# Patient Record
Sex: FEMALE | Race: BLACK OR AFRICAN AMERICAN | NOT HISPANIC OR LATINO
[De-identification: names, ages, dates, MRNs, and addresses within clinical notes are randomized per-mention and may not be internally consistent; named-entity substitution may affect disease eponyms.]

---

## 2018-10-30 ENCOUNTER — APPOINTMENT (OUTPATIENT)
Dept: SURGERY | Facility: CLINIC | Age: 28
End: 2018-10-30
Payer: MEDICAID

## 2018-10-30 VITALS
TEMPERATURE: 98 F | SYSTOLIC BLOOD PRESSURE: 106 MMHG | DIASTOLIC BLOOD PRESSURE: 78 MMHG | OXYGEN SATURATION: 100 % | WEIGHT: 293 LBS | HEART RATE: 85 BPM | BODY MASS INDEX: 55.32 KG/M2 | HEIGHT: 61 IN

## 2018-10-30 DIAGNOSIS — Z78.9 OTHER SPECIFIED HEALTH STATUS: ICD-10-CM

## 2018-10-30 DIAGNOSIS — Z87.891 PERSONAL HISTORY OF NICOTINE DEPENDENCE: ICD-10-CM

## 2018-10-30 PROCEDURE — 99204 OFFICE O/P NEW MOD 45 MIN: CPT

## 2018-11-13 ENCOUNTER — APPOINTMENT (OUTPATIENT)
Dept: SURGERY | Facility: CLINIC | Age: 28
End: 2018-11-13

## 2018-11-29 ENCOUNTER — APPOINTMENT (OUTPATIENT)
Dept: SURGERY | Facility: CLINIC | Age: 28
End: 2018-11-29

## 2019-05-14 ENCOUNTER — LABORATORY RESULT (OUTPATIENT)
Age: 29
End: 2019-05-14

## 2019-05-14 ENCOUNTER — APPOINTMENT (OUTPATIENT)
Dept: SURGERY | Facility: CLINIC | Age: 29
End: 2019-05-14
Payer: MEDICAID

## 2019-05-14 VITALS
DIASTOLIC BLOOD PRESSURE: 69 MMHG | HEIGHT: 61 IN | WEIGHT: 293 LBS | TEMPERATURE: 97.4 F | SYSTOLIC BLOOD PRESSURE: 102 MMHG | OXYGEN SATURATION: 96 % | HEART RATE: 82 BPM | BODY MASS INDEX: 55.32 KG/M2

## 2019-05-14 PROCEDURE — 99213 OFFICE O/P EST LOW 20 MIN: CPT

## 2019-05-14 NOTE — END OF VISIT
[FreeTextEntry3] : All medical record entries made by the Scribe were at my, Dr. Boone's direction and personally dictated by me on 05/14/2019  I have reviewed the chart and agree that the record accurately reflects my personal performance of the history, physical exam, assessment and plan. I have also personally directed, reviewed, and agreed with the chart.\par

## 2019-05-14 NOTE — ADDENDUM
[FreeTextEntry1] : Documented by Roberta Shell acting as a scribe for Dr. Oscar Boone on 05/14/2019\par

## 2019-05-14 NOTE — ASSESSMENT
[FreeTextEntry1] : 28 yo F with asthma and  is interested in bariatric surgery. She will begin her work up again.

## 2019-05-14 NOTE — HISTORY OF PRESENT ILLNESS
[de-identified] : 28 yo F with asthma and who presents to the clinic for follow up on her bariatric work up. She was here last year October but did not follow up with her appointments due to work. She wants to begin work up again. Denies any new medical complains since last visit. She is only concerned about her weight. Currently unemployed.

## 2019-05-15 ENCOUNTER — OTHER (OUTPATIENT)
Age: 29
End: 2019-05-15

## 2019-05-17 ENCOUNTER — OTHER (OUTPATIENT)
Age: 29
End: 2019-05-17

## 2019-05-20 ENCOUNTER — OTHER (OUTPATIENT)
Age: 29
End: 2019-05-20

## 2019-05-21 ENCOUNTER — TRANSCRIPTION ENCOUNTER (OUTPATIENT)
Age: 29
End: 2019-05-21

## 2019-05-30 ENCOUNTER — OUTPATIENT (OUTPATIENT)
Dept: OUTPATIENT SERVICES | Facility: HOSPITAL | Age: 29
LOS: 1 days | End: 2019-05-30
Payer: MEDICAID

## 2019-05-30 ENCOUNTER — APPOINTMENT (OUTPATIENT)
Dept: SLEEP CENTER | Facility: HOME HEALTH | Age: 29
End: 2019-05-30
Payer: MEDICAID

## 2019-05-30 PROCEDURE — 95800 SLP STDY UNATTENDED: CPT | Mod: 26

## 2019-05-30 PROCEDURE — 95800 SLP STDY UNATTENDED: CPT

## 2019-05-31 DIAGNOSIS — G47.33 OBSTRUCTIVE SLEEP APNEA (ADULT) (PEDIATRIC): ICD-10-CM

## 2019-06-11 ENCOUNTER — APPOINTMENT (OUTPATIENT)
Dept: BARIATRICS | Facility: CLINIC | Age: 29
End: 2019-06-11

## 2019-06-11 VITALS — BODY MASS INDEX: 55.32 KG/M2 | HEIGHT: 61 IN | WEIGHT: 293 LBS

## 2019-06-27 ENCOUNTER — APPOINTMENT (OUTPATIENT)
Dept: ULTRASOUND IMAGING | Facility: HOSPITAL | Age: 29
End: 2019-06-27
Payer: MEDICAID

## 2019-06-27 ENCOUNTER — APPOINTMENT (OUTPATIENT)
Dept: RADIOLOGY | Facility: HOSPITAL | Age: 29
End: 2019-06-27
Payer: MEDICAID

## 2019-06-27 ENCOUNTER — OUTPATIENT (OUTPATIENT)
Dept: OUTPATIENT SERVICES | Facility: HOSPITAL | Age: 29
LOS: 1 days | End: 2019-06-27
Payer: MEDICAID

## 2019-06-27 PROCEDURE — 74241: CPT | Mod: 26

## 2019-06-27 PROCEDURE — 76700 US EXAM ABDOM COMPLETE: CPT | Mod: 26

## 2019-06-27 PROCEDURE — 76700 US EXAM ABDOM COMPLETE: CPT

## 2019-06-27 PROCEDURE — 74241: CPT

## 2019-07-04 ENCOUNTER — FORM ENCOUNTER (OUTPATIENT)
Age: 29
End: 2019-07-04

## 2019-07-05 ENCOUNTER — OUTPATIENT (OUTPATIENT)
Dept: OUTPATIENT SERVICES | Facility: HOSPITAL | Age: 29
LOS: 1 days | End: 2019-07-05
Payer: COMMERCIAL

## 2019-07-05 ENCOUNTER — APPOINTMENT (OUTPATIENT)
Dept: PULMONOLOGY | Facility: CLINIC | Age: 29
End: 2019-07-05
Payer: MEDICAID

## 2019-07-05 VITALS
RESPIRATION RATE: 12 BRPM | HEIGHT: 61 IN | OXYGEN SATURATION: 96 % | HEART RATE: 95 BPM | DIASTOLIC BLOOD PRESSURE: 70 MMHG | BODY MASS INDEX: 55.32 KG/M2 | WEIGHT: 293 LBS | TEMPERATURE: 98.9 F | SYSTOLIC BLOOD PRESSURE: 110 MMHG

## 2019-07-05 DIAGNOSIS — Z82.49 FAMILY HISTORY OF ISCHEMIC HEART DISEASE AND OTHER DISEASES OF THE CIRCULATORY SYSTEM: ICD-10-CM

## 2019-07-05 PROCEDURE — 71046 X-RAY EXAM CHEST 2 VIEWS: CPT | Mod: 26

## 2019-07-05 PROCEDURE — 99204 OFFICE O/P NEW MOD 45 MIN: CPT

## 2019-07-05 PROCEDURE — 71046 X-RAY EXAM CHEST 2 VIEWS: CPT

## 2019-07-05 NOTE — PHYSICAL EXAM
[General Appearance - Well Developed] : well developed [Well Groomed] : well groomed [General Appearance - Well Nourished] : well nourished [Normal Appearance] : normal appearance [General Appearance - In No Acute Distress] : no acute distress [No Deformities] : no deformities [Normal Conjunctiva] : the conjunctiva exhibited no abnormalities [Normal Oropharynx] : normal oropharynx [Eyelids - No Xanthelasma] : the eyelids demonstrated no xanthelasmas [Neck Appearance] : the appearance of the neck was normal [Neck Cervical Mass (___cm)] : no neck mass was observed [Thyroid Diffuse Enlargement] : the thyroid was not enlarged [Jugular Venous Distention Increased] : there was no jugular-venous distention [Thyroid Nodule] : there were no palpable thyroid nodules [Heart Rate And Rhythm] : heart rate and rhythm were normal [Heart Sounds] : normal S1 and S2 [Respiration, Rhythm And Depth] : normal respiratory rhythm and effort [Murmurs] : no murmurs present [Exaggerated Use Of Accessory Muscles For Inspiration] : no accessory muscle use [Abdomen Soft] : soft [Auscultation Breath Sounds / Voice Sounds] : lungs were clear to auscultation bilaterally [Abdomen Tenderness] : non-tender [Abnormal Walk] : normal gait [Abdomen Mass (___ Cm)] : no abdominal mass palpated [Gait - Sufficient For Exercise Testing] : the gait was sufficient for exercise testing [Skin Color & Pigmentation] : normal skin color and pigmentation [Skin Turgor] : normal skin turgor [Sensation] : the sensory exam was normal to light touch and pinprick [] : no rash [Deep Tendon Reflexes (DTR)] : deep tendon reflexes were 2+ and symmetric [Impaired Insight] : insight and judgment were intact [Oriented To Time, Place, And Person] : oriented to person, place, and time [No Focal Deficits] : no focal deficits [Affect] : the affect was normal

## 2019-07-06 NOTE — HISTORY OF PRESENT ILLNESS
[Difficulty Breathing During Exertion] : denies dyspnea on exertion [Cough] : denies coughing [Feelings Of Weakness On Exertion] : denies exercise intolerance [Regional Soft Tissue Swelling Both Lower Extremities] : denies lower extremity edema [Wheezing] : denies wheezing [Fever] : denies fever [Chest Pain Or Discomfort] : denies chest pain [0  -  Nothing at all] : 0, nothing at all [Class I - No Symptoms and No Limitations] : I [Never] : was never a smoker [URI] : upper respiratory tract infection [Cold] : cold weather [Exercise] : exercise [Smog] : smog [Adherent] : the patient is adherent with ~his/her~ medication regimen [Goals--Doing Well] : the patient is doing well with ~his/her~ goals [Snoring] : snoring [Awakes Unrefreshed] : awakening unrefreshed [Awakening With Dry Mouth] : awakening with dry mouth [Wt Gain ___ Lbs] : no recent weight gain [Wt Loss ___ Lbs] : no recent weight loss [Oxygen] : the patient uses no supplemental oxygen [Good Control] : peak flow has been poor [More Frequent Use Needed Recently] : Patient reports no recent increase in frequency of [Side Effects] : ~He/She~ denies medication side effects [Witnessed Apneas] : no witnessed sleep apnea [Daytime Somnolence] : no daytime somnolence [Awakes with Headache] : no headache upon awakening [FreeTextEntry1] : she is scheduled for bariatric surgery.  She uses the albuterol as needed basis.  She used the albuterol more than often last week as she was sick.  She is not sob.  She can walk few blocks without problem.  The asthma acts up when she gets a cold.

## 2019-07-06 NOTE — ASSESSMENT
[FreeTextEntry1] : Preop evaluation\par DOUGLAS CANO  is optimized for surgery. she  is to be extubated once fully awake and able to protect airway.  The patient is to be monitored in the recovery room. They might benefit for high flow oxygen or noninvasive ventilation to prevent or reverse atelectasis.  Patient is to be admitted to a monitored bed postoperatively.  Avoid oversedation.  DOUGLAS is high risk for DVT and will require bimodal agents for DVT prophylaxis early mobilization is recommended. she is to use the incentive spirometry postoperative. \par \par Bronchial asthma.\par I instructed the patient to continue on Symbicort 2 puffs twice a day and albuterol as needed. Patient was using albuterol frequently. The patient was admitted twice this year. She is intermittently on prednisone. I discussed with the patient effect of morbid obesity owner asthma and the benefit of weight reduction to control her asthma symptoms. I advised the patient to continue Symbicort on regular basis 2 puffs twice a day. PFT. The patient was instructed to hold the Symbicort the day of the test.\par \par Obstructive sleep apnea\par DOUGLAS CANO is to had a [] sleep study. I discussed sleep apnea in detail with the patient. I explained the benefit of weight reduction surgery for sleep apnea.  Mallampati [3]  and neck circumference is [19]  inch .  I explained sleep apnea might not improve with surgery due to the anatomical features with short thick neck and small mandible.  Pt is to repeat the sleep study 2 months after surg if positive for sleep apnea.  I discussed the sleep study with the patient in details and Rusty severe sleep apnea. I will follow the CPAP mask. I explained the benefit of bariatric surgery on sleep apnea. Anatomically she had a short thick neck and the sleep apnea might not resolve with bariatric surgeon\par \par \par

## 2019-07-10 ENCOUNTER — APPOINTMENT (OUTPATIENT)
Dept: PULMONOLOGY | Facility: CLINIC | Age: 29
End: 2019-07-10
Payer: MEDICAID

## 2019-07-10 PROCEDURE — 94060 EVALUATION OF WHEEZING: CPT

## 2019-07-10 PROCEDURE — 94729 DIFFUSING CAPACITY: CPT

## 2019-07-10 PROCEDURE — 94727 GAS DIL/WSHOT DETER LNG VOL: CPT

## 2019-07-15 ENCOUNTER — APPOINTMENT (OUTPATIENT)
Dept: BARIATRICS | Facility: CLINIC | Age: 29
End: 2019-07-15
Payer: MEDICAID

## 2019-07-15 VITALS — WEIGHT: 293 LBS | HEIGHT: 61 IN | BODY MASS INDEX: 55.32 KG/M2

## 2019-07-15 PROCEDURE — 97802 MEDICAL NUTRITION INDIV IN: CPT

## 2019-08-15 ENCOUNTER — APPOINTMENT (OUTPATIENT)
Dept: BARIATRICS | Facility: CLINIC | Age: 29
End: 2019-08-15
Payer: MEDICAID

## 2019-08-15 VITALS — WEIGHT: 293 LBS | BODY MASS INDEX: 55.32 KG/M2 | HEIGHT: 61 IN

## 2019-08-15 PROCEDURE — 97803 MED NUTRITION INDIV SUBSEQ: CPT

## 2019-09-16 ENCOUNTER — APPOINTMENT (OUTPATIENT)
Dept: BARIATRICS | Facility: CLINIC | Age: 29
End: 2019-09-16
Payer: MEDICAID

## 2019-09-16 VITALS — BODY MASS INDEX: 55.32 KG/M2 | HEIGHT: 61 IN | WEIGHT: 293 LBS

## 2019-09-16 PROCEDURE — 97803 MED NUTRITION INDIV SUBSEQ: CPT

## 2019-10-14 ENCOUNTER — APPOINTMENT (OUTPATIENT)
Dept: BARIATRICS | Facility: CLINIC | Age: 29
End: 2019-10-14

## 2020-02-04 ENCOUNTER — APPOINTMENT (OUTPATIENT)
Dept: SURGERY | Facility: CLINIC | Age: 30
End: 2020-02-04
Payer: COMMERCIAL

## 2020-02-04 VITALS
WEIGHT: 293 LBS | BODY MASS INDEX: 55.32 KG/M2 | HEIGHT: 61 IN | DIASTOLIC BLOOD PRESSURE: 79 MMHG | OXYGEN SATURATION: 96 % | HEART RATE: 93 BPM | SYSTOLIC BLOOD PRESSURE: 126 MMHG | TEMPERATURE: 97.2 F

## 2020-02-04 PROCEDURE — 99214 OFFICE O/P EST MOD 30 MIN: CPT

## 2020-02-04 NOTE — ASSESSMENT
[FreeTextEntry1] : 30 y/o F with hx of asthma, PCOS, PreDM, NIR and morbid obesity (BMI 68), with interest in bariatric surgery. She is strongly reminded on the importance of smoking cessation prior to her surgery. \par \par Discussed with the patient on available options in bariatric surgery including VSG vs gastric bypass. Risks including possible MI, DVT, bleeding, Pulmonary Embolism, as well as benefits and the need for post-operative care and required dietary adjustment to the proposed procedures were discussed with the patient and all questions were answered to their satisfaction. \par \par Work up process and need for monthly weigh ins was again explained to the patient. Patient understands and is interested in VSG. Will determine patient's candidacy for VSG after completion and review of work up. Will begin work up process, including a Sleep study to assess for any sleep disorders associated with morbid obesity. She is also to see the nutritionist to start a weight loss diet to help reduce her BMI prior to her surgery. Will follow up here as needed for help in completing work up as well as to discuss the results and to determine a plan of care.

## 2020-02-04 NOTE — HISTORY OF PRESENT ILLNESS
[de-identified] : 28 y/o F with hx of asthma, PCOS, PreDM, NIR and morbid obesity (BMI 68), presents here today for follow up consultation for bariatric surgery. She was previously seen in 2018 where she began her work up but did not follow through with this. She is here today to restart her work up.\par \par She reports having struggled with her weight her whole life which is refractory to diets and exercise. She states that growing up, her mother did not cook healthy foods for her. Additionally, she admits to poor eating habits that exacerbated her weight gain. She reports that today is the heaviest she weighs (362lbs). She has been exercising twice a week so far. She is on albuterol for her asthma. She will be starting employment in the [employer] department. She admits to drinking alcohol occasionally and smoking occasionally. Denies any acid reflux, heartburn, constipation or diarrhea. She is interested in VSG.

## 2020-02-04 NOTE — ADDENDUM
[FreeTextEntry1] : Documented by Roberta Shlel acting as a scribe for Dr. Oscar Boone on 02/04/2020\par

## 2020-02-04 NOTE — END OF VISIT
[FreeTextEntry3] : All medical record entries made by the Scribe were at my, Dr. Boone's direction and personally dictated by me on 02/04/2020  I have reviewed the chart and agree that the record accurately reflects my personal performance of the history, physical exam, assessment and plan. I have also personally directed, reviewed, and agreed with the chart.\par \par

## 2020-02-04 NOTE — PLAN
[FreeTextEntry1] : Pt to start work up, including sleep study to rule out sleep disorders 2/2 obesity.\par See nutri

## 2020-02-05 RX ORDER — IRON PS COMPLEX/B12/FOLIC ACID 150-25-1
150-25-1 CAPSULE ORAL
Qty: 30 | Refills: 6 | Status: ACTIVE | COMMUNITY
Start: 2020-02-05 | End: 1900-01-01

## 2020-02-05 RX ORDER — ERGOCALCIFEROL 1.25 MG/1
1.25 MG CAPSULE, LIQUID FILLED ORAL
Qty: 12 | Refills: 0 | Status: ACTIVE | COMMUNITY
Start: 2020-02-05 | End: 1900-01-01

## 2020-02-25 ENCOUNTER — OUTPATIENT (OUTPATIENT)
Dept: OUTPATIENT SERVICES | Facility: HOSPITAL | Age: 30
LOS: 1 days | End: 2020-02-25
Payer: COMMERCIAL

## 2020-02-25 ENCOUNTER — APPOINTMENT (OUTPATIENT)
Dept: SLEEP CENTER | Facility: HOME HEALTH | Age: 30
End: 2020-02-25
Payer: MEDICAID

## 2020-02-25 PROCEDURE — 95800 SLP STDY UNATTENDED: CPT | Mod: 26

## 2020-02-25 PROCEDURE — 95800 SLP STDY UNATTENDED: CPT

## 2020-02-26 DIAGNOSIS — G47.33 OBSTRUCTIVE SLEEP APNEA (ADULT) (PEDIATRIC): ICD-10-CM

## 2020-03-06 VITALS — WEIGHT: 293 LBS | HEIGHT: 61 IN | BODY MASS INDEX: 55.32 KG/M2

## 2020-03-17 ENCOUNTER — APPOINTMENT (OUTPATIENT)
Dept: BARIATRICS | Facility: CLINIC | Age: 30
End: 2020-03-17

## 2020-03-23 ENCOUNTER — APPOINTMENT (OUTPATIENT)
Dept: BARIATRICS | Facility: CLINIC | Age: 30
End: 2020-03-23
Payer: MEDICAID

## 2020-03-23 PROCEDURE — 98967 PH1 ASSMT&MGMT NQHP 11-20: CPT

## 2020-04-02 ENCOUNTER — APPOINTMENT (OUTPATIENT)
Dept: PULMONOLOGY | Facility: CLINIC | Age: 30
End: 2020-04-02

## 2020-04-09 VITALS — HEIGHT: 61 IN | BODY MASS INDEX: 55.32 KG/M2 | WEIGHT: 293 LBS

## 2020-05-11 ENCOUNTER — APPOINTMENT (OUTPATIENT)
Dept: BARIATRICS | Facility: CLINIC | Age: 30
End: 2020-05-11
Payer: MEDICAID

## 2020-05-11 VITALS — BODY MASS INDEX: 55.32 KG/M2 | HEIGHT: 61 IN | WEIGHT: 293 LBS

## 2020-05-11 PROCEDURE — 98967 PH1 ASSMT&MGMT NQHP 11-20: CPT

## 2020-06-08 VITALS — HEIGHT: 61 IN | WEIGHT: 293 LBS | BODY MASS INDEX: 55.32 KG/M2

## 2020-06-09 LAB
25(OH)D3 SERPL-MCNC: 17.4 NG/ML
A-TOCOPHEROL VIT E SERPL-MCNC: 7.1 MG/L
ALBUMIN SERPL ELPH-MCNC: 3.7 G/DL
ALP BLD-CCNC: 69 U/L
ALT SERPL-CCNC: 26 U/L
ANION GAP SERPL CALC-SCNC: 12 MMOL/L
APPEARANCE: ABNORMAL
AST SERPL-CCNC: 27 U/L
BACTERIA: NEGATIVE
BASOPHILS # BLD AUTO: 0.04 K/UL
BASOPHILS NFR BLD AUTO: 0.6 %
BETA+GAMMA TOCOPHEROL SERPL-MCNC: 2.8 MG/L
BILIRUB SERPL-MCNC: 0.2 MG/DL
BILIRUBIN URINE: NEGATIVE
BLOOD URINE: ABNORMAL
BUN SERPL-MCNC: 10 MG/DL
CA-I SERPL-SCNC: 1.2 MMOL/L
CALCIUM SERPL-MCNC: 9.1 MG/DL
CALCIUM SERPL-MCNC: 9.1 MG/DL
CHLORIDE SERPL-SCNC: 102 MMOL/L
CHOLEST SERPL-MCNC: 181 MG/DL
CHOLEST/HDLC SERPL: 5 RATIO
CO2 SERPL-SCNC: 25 MMOL/L
COLOR: NORMAL
CREAT SERPL-MCNC: 0.81 MG/DL
EOSINOPHIL # BLD AUTO: 0.28 K/UL
EOSINOPHIL NFR BLD AUTO: 4.2 %
ESTIMATED AVERAGE GLUCOSE: 128 MG/DL
FOLATE SERPL-MCNC: 5.9 NG/ML
GLUCOSE QUALITATIVE U: NEGATIVE
GLUCOSE SERPL-MCNC: 79 MG/DL
HBA1C MFR BLD HPLC: 6.1 %
HCG SERPL QL: NEGATIVE
HCT VFR BLD CALC: 35.8 %
HDLC SERPL-MCNC: 36 MG/DL
HGB BLD-MCNC: 10.8 G/DL
HYALINE CASTS: 2 /LPF
IMM GRANULOCYTES NFR BLD AUTO: 0.8 %
INR PPP: 0.97 RATIO
IRON SATN MFR SERPL: 7 %
IRON SERPL-MCNC: 20 UG/DL
KETONES URINE: NEGATIVE
LDLC SERPL CALC-MCNC: 130 MG/DL
LEUKOCYTE ESTERASE URINE: NEGATIVE
LYMPHOCYTES # BLD AUTO: 1.91 K/UL
LYMPHOCYTES NFR BLD AUTO: 28.9 %
MAN DIFF?: NORMAL
MCHC RBC-ENTMCNC: 26.3 PG
MCHC RBC-ENTMCNC: 30.2 GM/DL
MCV RBC AUTO: 87.1 FL
MICROSCOPIC-UA: NORMAL
MONOCYTES # BLD AUTO: 0.58 K/UL
MONOCYTES NFR BLD AUTO: 8.8 %
NEUTROPHILS # BLD AUTO: 3.75 K/UL
NEUTROPHILS NFR BLD AUTO: 56.7 %
NITRITE URINE: NEGATIVE
PAPP-A SERPL-ACNC: <1 MIU/ML
PARATHYROID HORMONE INTACT: 52 PG/ML
PH URINE: 6
PLATELET # BLD AUTO: 371 K/UL
POTASSIUM SERPL-SCNC: 4.2 MMOL/L
PREALB SERPL NEPH-MCNC: 18 MG/DL
PROT SERPL-MCNC: 6.9 G/DL
PROTEIN URINE: NORMAL
PT BLD: 11.2 SEC
RBC # BLD: 4.11 M/UL
RBC # FLD: 18.6 %
RED BLOOD CELLS URINE: 15 /HPF
SODIUM SERPL-SCNC: 140 MMOL/L
SPECIFIC GRAVITY URINE: 1.02
SQUAMOUS EPITHELIAL CELLS: 13 /HPF
TIBC SERPL-MCNC: 302 UG/DL
TRIGL SERPL-MCNC: 75 MG/DL
TSH SERPL-ACNC: 3.46 UIU/ML
UIBC SERPL-MCNC: 282 UG/DL
UREA BREATH TEST QL: NEGATIVE
UROBILINOGEN URINE: NORMAL
VIT A SERPL-MCNC: 25.2 UG/DL
VIT B1 SERPL-MCNC: 75 NMOL/L
VIT B12 SERPL-MCNC: 808 PG/ML
WBC # FLD AUTO: 6.61 K/UL
WHITE BLOOD CELLS URINE: 7 /HPF
ZINC SERPL-MCNC: 60 UG/DL

## 2020-07-08 ENCOUNTER — APPOINTMENT (OUTPATIENT)
Dept: RADIOLOGY | Facility: HOSPITAL | Age: 30
End: 2020-07-08

## 2020-07-08 ENCOUNTER — APPOINTMENT (OUTPATIENT)
Dept: ULTRASOUND IMAGING | Facility: HOSPITAL | Age: 30
End: 2020-07-08

## 2020-08-14 ENCOUNTER — APPOINTMENT (OUTPATIENT)
Dept: SURGERY | Facility: CLINIC | Age: 30
End: 2020-08-14

## 2021-03-02 ENCOUNTER — APPOINTMENT (OUTPATIENT)
Dept: SURGERY | Facility: CLINIC | Age: 31
End: 2021-03-02

## 2021-05-11 ENCOUNTER — LABORATORY RESULT (OUTPATIENT)
Age: 31
End: 2021-05-11

## 2021-05-11 ENCOUNTER — APPOINTMENT (OUTPATIENT)
Dept: SURGERY | Facility: CLINIC | Age: 31
End: 2021-05-11
Payer: MEDICAID

## 2021-05-11 VITALS
HEART RATE: 92 BPM | HEIGHT: 61 IN | SYSTOLIC BLOOD PRESSURE: 117 MMHG | DIASTOLIC BLOOD PRESSURE: 79 MMHG | BODY MASS INDEX: 55.32 KG/M2 | WEIGHT: 293 LBS | TEMPERATURE: 97.6 F | OXYGEN SATURATION: 97 %

## 2021-05-11 PROCEDURE — 99213 OFFICE O/P EST LOW 20 MIN: CPT

## 2021-05-11 PROCEDURE — 99072 ADDL SUPL MATRL&STAF TM PHE: CPT

## 2021-05-11 NOTE — HISTORY OF PRESENT ILLNESS
[de-identified] : Pt is a 32 y/o F with PMHx of obesity (BMI 68.6), asthma, preDM, PCOS, NIR, irregular periods and no significant PSHx who presents for bariatric follow up. She was last seen in 2019 but wants to restart the bariatric workup process now. Pt is doing well overall today. She reports she was out of work for a year and gained a lot of weight during that time but she is planning to go back to work for the [employer] department. She admits to social alcohol. Pt is single and lives with her mother, who is supportive of her decision to seek bariatric surgery. She does not have any children.\par Patient to go for sleep study to evaluate any sleep disordered breathing associated with morbid obesity.

## 2021-05-11 NOTE — END OF VISIT
[FreeTextEntry3] : All medical record entries made by the Scribe were at my, ADRIANA Lyons, discretion and personally dictated by me on 05/11/2021 . I have reviewed the chart and agree that the record accurately reflects my personal performance of the history, physical exam, assessment and plan. I have also personally directed, reviewed and agreed to the chart.

## 2021-05-11 NOTE — ADDENDUM
[FreeTextEntry1] : This note was written by Em Marshall on 05/11/2021 acting as scribe for ADRIANA Lyons.

## 2021-05-11 NOTE — ASSESSMENT
[FreeTextEntry1] : Pt is a 30 y/o F with PMHx of obesity, asthma, preDM who presents for bariatric follow up. Will continue bariatric work up including sleep study to rule out any sleep disorders associated with morbid obesity. I recommended pt work on weight loss prior to the operation. Patient will meet our dietician to discuss nutritional counseling. She will follow up after bariatric testing.

## 2021-05-11 NOTE — PLAN
[FreeTextEntry1] : Will continue bariatric work up including sleep study to rule out any sleep disorders associated with morbid obesity. \par See dietician for wt loss diet preop

## 2021-05-12 DIAGNOSIS — Z00.00 ENCOUNTER FOR GENERAL ADULT MEDICAL EXAMINATION W/OUT ABNORMAL FINDINGS: ICD-10-CM

## 2021-05-12 LAB
25(OH)D3 SERPL-MCNC: 32.3 NG/ML
ALBUMIN SERPL ELPH-MCNC: 4 G/DL
ALP BLD-CCNC: 77 U/L
ALT SERPL-CCNC: 39 U/L
ANION GAP SERPL CALC-SCNC: 12 MMOL/L
APPEARANCE: ABNORMAL
AST SERPL-CCNC: 34 U/L
BACTERIA: NEGATIVE
BASOPHILS # BLD AUTO: 0.06 K/UL
BASOPHILS NFR BLD AUTO: 0.8 %
BILIRUB SERPL-MCNC: 0.2 MG/DL
BILIRUBIN URINE: ABNORMAL
BLOOD URINE: ABNORMAL
BUN SERPL-MCNC: 12 MG/DL
CA-I SERPL-SCNC: 1.18 MMOL/L
CALCIUM SERPL-MCNC: 8.8 MG/DL
CALCIUM SERPL-MCNC: 8.8 MG/DL
CHLORIDE SERPL-SCNC: 102 MMOL/L
CHOLEST SERPL-MCNC: 164 MG/DL
CO2 SERPL-SCNC: 24 MMOL/L
COLOR: ABNORMAL
CREAT SERPL-MCNC: 0.76 MG/DL
EOSINOPHIL # BLD AUTO: 0.44 K/UL
EOSINOPHIL NFR BLD AUTO: 5.5 %
ESTIMATED AVERAGE GLUCOSE: 128 MG/DL
FOLATE SERPL-MCNC: 10.7 NG/ML
GLUCOSE QUALITATIVE U: NEGATIVE
GLUCOSE SERPL-MCNC: 83 MG/DL
HBA1C MFR BLD HPLC: 6.1 %
HCG SERPL QL: NEGATIVE
HCT VFR BLD CALC: 35.1 %
HDLC SERPL-MCNC: 35 MG/DL
HGB BLD-MCNC: 9.6 G/DL
HYALINE CASTS: 0 /LPF
IMM GRANULOCYTES NFR BLD AUTO: 0.5 %
INR PPP: 1.01 RATIO
IRON SATN MFR SERPL: 5 %
IRON SERPL-MCNC: 19 UG/DL
KETONES URINE: NEGATIVE
LDLC SERPL CALC-MCNC: 116 MG/DL
LEUKOCYTE ESTERASE URINE: NEGATIVE
LYMPHOCYTES # BLD AUTO: 2.33 K/UL
LYMPHOCYTES NFR BLD AUTO: 29.2 %
MAN DIFF?: NORMAL
MCHC RBC-ENTMCNC: 21.9 PG
MCHC RBC-ENTMCNC: 27.4 GM/DL
MCV RBC AUTO: 80.1 FL
MICROSCOPIC-UA: NORMAL
MONOCYTES # BLD AUTO: 0.52 K/UL
MONOCYTES NFR BLD AUTO: 6.5 %
NEUTROPHILS # BLD AUTO: 4.58 K/UL
NEUTROPHILS NFR BLD AUTO: 57.5 %
NITRITE URINE: NEGATIVE
NONHDLC SERPL-MCNC: 129 MG/DL
PAPP-A SERPL-ACNC: <1 MIU/ML
PARATHYROID HORMONE INTACT: 49 PG/ML
PH URINE: 5
PLATELET # BLD AUTO: 398 K/UL
POTASSIUM SERPL-SCNC: 4.6 MMOL/L
PREALB SERPL NEPH-MCNC: 20 MG/DL
PROT SERPL-MCNC: 7.2 G/DL
PROTEIN URINE: ABNORMAL
PT BLD: 11.9 SEC
RBC # BLD: 4.38 M/UL
RBC # FLD: 22.6 %
RED BLOOD CELLS URINE: 10 /HPF
SODIUM SERPL-SCNC: 139 MMOL/L
SPECIFIC GRAVITY URINE: >=1.03
SQUAMOUS EPITHELIAL CELLS: 22 /HPF
TIBC SERPL-MCNC: 363 UG/DL
TRIGL SERPL-MCNC: 65 MG/DL
TSH SERPL-ACNC: 2.66 UIU/ML
UIBC SERPL-MCNC: 344 UG/DL
UROBILINOGEN URINE: NORMAL
VIT B12 SERPL-MCNC: 1036 PG/ML
WBC # FLD AUTO: 7.97 K/UL
WHITE BLOOD CELLS URINE: 83 /HPF

## 2021-05-12 RX ORDER — IRON PS COMPLEX/B12/FOLIC ACID 150-25-1
150-25-1 CAPSULE ORAL
Qty: 30 | Refills: 2 | Status: ACTIVE | COMMUNITY
Start: 2021-05-12 | End: 1900-01-01

## 2021-05-13 LAB — UREA BREATH TEST QL: NEGATIVE

## 2021-05-18 ENCOUNTER — APPOINTMENT (OUTPATIENT)
Dept: HEART AND VASCULAR | Facility: CLINIC | Age: 31
End: 2021-05-18
Payer: MEDICAID

## 2021-05-18 VITALS
DIASTOLIC BLOOD PRESSURE: 70 MMHG | OXYGEN SATURATION: 9 % | HEIGHT: 61 IN | SYSTOLIC BLOOD PRESSURE: 110 MMHG | WEIGHT: 293 LBS | BODY MASS INDEX: 55.32 KG/M2 | TEMPERATURE: 98 F | HEART RATE: 81 BPM

## 2021-05-18 LAB
A-TOCOPHEROL VIT E SERPL-MCNC: 7.7 MG/L
BETA+GAMMA TOCOPHEROL SERPL-MCNC: 2.8 MG/L
VIT A SERPL-MCNC: 38.7 UG/DL
VIT B1 SERPL-MCNC: 146.4 NMOL/L
ZINC SERPL-MCNC: 90 UG/DL

## 2021-05-18 PROCEDURE — 99204 OFFICE O/P NEW MOD 45 MIN: CPT

## 2021-05-18 PROCEDURE — 99072 ADDL SUPL MATRL&STAF TM PHE: CPT

## 2021-05-18 PROCEDURE — 99214 OFFICE O/P EST MOD 30 MIN: CPT

## 2021-05-18 PROCEDURE — 93000 ELECTROCARDIOGRAM COMPLETE: CPT | Mod: NC

## 2021-05-18 NOTE — REVIEW OF SYSTEMS
[SOB] : shortness of breath [Joint Pain] : joint pain [Joint Stiffness] : joint stiffness [Negative] : Heme/Lymph

## 2021-05-18 NOTE — HISTORY OF PRESENT ILLNESS
[FreeTextEntry1] : for cardiac evaluation prior to bariatric surgery\par notes SOB, limited walking due to Covid, now vaccinated

## 2021-05-18 NOTE — PHYSICAL EXAM
[Well Developed] : well developed [Well Nourished] : well nourished [No Acute Distress] : no acute distress [Normal Conjunctiva] : normal conjunctiva [Normal Venous Pressure] : normal venous pressure [No Carotid Bruit] : no carotid bruit [Normal S1, S2] : normal S1, S2 [No Murmur] : no murmur [No Rub] : no rub [No Gallop] : no gallop [Clear Lung Fields] : clear lung fields [Good Air Entry] : good air entry [No Respiratory Distress] : no respiratory distress  [Soft] : abdomen soft [Non Tender] : non-tender [No Masses/organomegaly] : no masses/organomegaly [Normal Bowel Sounds] : normal bowel sounds [Normal Gait] : normal gait [No Edema] : no edema [No Cyanosis] : no cyanosis [No Clubbing] : no clubbing [No Rash] : no rash [Moves all extremities] : moves all extremities [No Focal Deficits] : no focal deficits [Normal Speech] : normal speech [Alert and Oriented] : alert and oriented [Normal memory] : normal memory

## 2021-05-25 ENCOUNTER — APPOINTMENT (OUTPATIENT)
Dept: HEART AND VASCULAR | Facility: CLINIC | Age: 31
End: 2021-05-25

## 2021-06-02 ENCOUNTER — APPOINTMENT (OUTPATIENT)
Dept: PULMONOLOGY | Facility: CLINIC | Age: 31
End: 2021-06-02

## 2021-06-28 ENCOUNTER — APPOINTMENT (OUTPATIENT)
Dept: BARIATRICS | Facility: CLINIC | Age: 31
End: 2021-06-28
Payer: MEDICAID

## 2021-06-28 PROCEDURE — 97803 MED NUTRITION INDIV SUBSEQ: CPT | Mod: 95

## 2021-06-30 ENCOUNTER — APPOINTMENT (OUTPATIENT)
Dept: HEMATOLOGY ONCOLOGY | Facility: CLINIC | Age: 31
End: 2021-06-30
Payer: MEDICAID

## 2021-06-30 VITALS
OXYGEN SATURATION: 98 % | WEIGHT: 293 LBS | TEMPERATURE: 97.6 F | HEART RATE: 93 BPM | SYSTOLIC BLOOD PRESSURE: 120 MMHG | BODY MASS INDEX: 55.32 KG/M2 | DIASTOLIC BLOOD PRESSURE: 78 MMHG | HEIGHT: 61 IN

## 2021-06-30 DIAGNOSIS — Z80.3 FAMILY HISTORY OF MALIGNANT NEOPLASM OF BREAST: ICD-10-CM

## 2021-06-30 DIAGNOSIS — Z80.1 FAMILY HISTORY OF MALIGNANT NEOPLASM OF TRACHEA, BRONCHUS AND LUNG: ICD-10-CM

## 2021-06-30 DIAGNOSIS — Z82.3 FAMILY HISTORY OF STROKE: ICD-10-CM

## 2021-06-30 DIAGNOSIS — R53.83 OTHER FATIGUE: ICD-10-CM

## 2021-06-30 DIAGNOSIS — F50.89 OTHER SPECIFIED EATING DISORDER: ICD-10-CM

## 2021-06-30 DIAGNOSIS — D50.9 IRON DEFICIENCY ANEMIA, UNSPECIFIED: ICD-10-CM

## 2021-06-30 LAB
RBC # BLD: 4.36 M/UL
RETICS # AUTO: 2.3 %
RETICS AGGREG/RBC NFR: 98.5 K/UL

## 2021-06-30 PROCEDURE — 99204 OFFICE O/P NEW MOD 45 MIN: CPT | Mod: 25

## 2021-06-30 NOTE — PHYSICAL EXAM
[Obese] : obese [Normal] : affect appropriate [de-identified] : mild conjunctival pallor [de-identified] : morbidly obese

## 2021-06-30 NOTE — HISTORY OF PRESENT ILLNESS
[de-identified] : Patient is a 31 year old female with a history of polycystic ovary syndrome, very heavy and irregular menses, and asthma, who is referred for evaluation and management of iron deficiency anemia. Patient is planning bariatric surgery. Patient admits to irregular and heavy menses lasting for months at a time. She started hormonal medication  to regulate her periods, but discontinued them because they caused weight gain. In May 2021, patient had a hemoglobin of 9.6 with hematocrit 35.1, and a normal white blood count, red blood count, and platelet count. Serum iron was 19 with a TIBC of 363 and 5% saturation. CMP was completely normal. TSH, B12, and Vitamin D were normal. Patient has been taking prescription oral iron for the past month. She reports no family history of anemia. Patient complains of fatigue, craving ice (pica), occasional constipation, and shortness of breath due to asthma. Patient states she felt dizzy and fatigued yesterday at work, which she partially attributes to the hot weather. Denies palpitations, recent infection, fever, chills, and sweats.

## 2021-06-30 NOTE — REVIEW OF SYSTEMS
[Fatigue] : fatigue [Shortness Of Breath] : shortness of breath [SOB on Exertion] : shortness of breath during exertion [Constipation] : constipation [Dizziness] : dizziness [Negative] : Allergic/Immunologic [Fever] : no fever [Chills] : no chills [Night Sweats] : no night sweats [Recent Change In Weight] : ~T no recent weight change [Chest Pain] : no chest pain [Palpitations] : no palpitations [Abdominal Pain] : no abdominal pain [Vomiting] : no vomiting [Diarrhea] : no diarrhea [FreeTextEntry6] : attributed to asthma [FreeTextEntry7] : occasional constipation [FreeTextEntry8] : Very heavy and irregular menses, has PCOS

## 2021-06-30 NOTE — END OF VISIT
[FreeTextEntry3] : All medical record entries made by the Scribe were at my, Dr. Bernie Jordan, direction and personally dictated by me on 06/30/2021. I have reviewed the chart and agree that the record accurately reflects my personal performance of the history, physical exam, assessment and plan. I have also personally directed, reviewed, and agreed with the chart.

## 2021-06-30 NOTE — REASON FOR VISIT
[Initial Consultation] : an initial consultation for [FreeTextEntry2] : Iron deficiency anemia, Fatigue, PICA

## 2021-06-30 NOTE — ASSESSMENT
[FreeTextEntry1] : Patient is a 31 year old female with a history of polycystic ovary syndrome, very heavy and irregular menses and asthma, who is referred for evaluation and management of iron deficiency anemia. Patient is planning bariatric surgery. Possible etiologies for iron deficiency include heavy and prolonged menstrual blood loss and nutritional deficiency due to lack of absorption. Recent blood results show a red blood count number  within the normal range despite anemia, which may be a sign of an underlying hemoglobinopathy as well. Treatment with iron by infusion was discussed but patient is not amenable to this at the present time.Patient will continue prescription iron and was advised to take it together with vitamin C. Have ordered B12 and folate, CBC, CMP, ferritin, hemoglobin electrophoresis, iron panel, and reticulocyte count. Patient was advised to call office to discuss results and further recommendations.

## 2021-07-01 LAB
ALBUMIN SERPL ELPH-MCNC: 4.1 G/DL
ALP BLD-CCNC: 75 U/L
ALT SERPL-CCNC: 32 U/L
ANION GAP SERPL CALC-SCNC: 11 MMOL/L
ANISOCYTOSIS BLD QL: SLIGHT
AST SERPL-CCNC: 36 U/L
BASOPHILS # BLD AUTO: 0 K/UL
BASOPHILS # BLD AUTO: 0 K/UL
BASOPHILS NFR BLD AUTO: 0 %
BASOPHILS NFR BLD AUTO: 0 %
BILIRUB SERPL-MCNC: 0.2 MG/DL
BUN SERPL-MCNC: 11 MG/DL
CALCIUM SERPL-MCNC: 9.5 MG/DL
CHLORIDE SERPL-SCNC: 104 MMOL/L
CO2 SERPL-SCNC: 26 MMOL/L
CREAT SERPL-MCNC: 0.77 MG/DL
EOSINOPHIL # BLD AUTO: 0.41 K/UL
EOSINOPHIL # BLD AUTO: 0.41 K/UL
EOSINOPHIL NFR BLD AUTO: 5.2 %
EOSINOPHIL NFR BLD AUTO: 5.2 %
FERRITIN SERPL-MCNC: 34 NG/ML
FOLATE SERPL-MCNC: >20 NG/ML
GLUCOSE SERPL-MCNC: 88 MG/DL
HCT VFR BLD CALC: 34.5 %
HGB BLD-MCNC: 10.1 G/DL
HYPOCHROMIA BLD QL SMEAR: NORMAL
IRON SATN MFR SERPL: 4 %
IRON SERPL-MCNC: 16 UG/DL
LYMPHOCYTES # BLD AUTO: 2.33 K/UL
LYMPHOCYTES # BLD AUTO: 2.33 K/UL
LYMPHOCYTES NFR BLD AUTO: 29.6 %
LYMPHOCYTES NFR BLD AUTO: 29.6 %
MAN DIFF?: NORMAL
MCHC RBC-ENTMCNC: 23.2 PG
MCHC RBC-ENTMCNC: 29.3 GM/DL
MCV RBC AUTO: 79.1 FL
MICROCYTES BLD QL SMEAR: SLIGHT
MONOCYTES # BLD AUTO: 0.41 K/UL
MONOCYTES # BLD AUTO: 0.41 K/UL
MONOCYTES NFR BLD AUTO: 5.2 %
MONOCYTES NFR BLD AUTO: 5.2 %
MSMEAR: NORMAL
NEUTROPHILS # BLD AUTO: 4.72 K/UL
NEUTROPHILS # BLD AUTO: 4.72 K/UL
NEUTROPHILS NFR BLD AUTO: 60 %
NEUTROPHILS NFR BLD AUTO: 60 %
OVALOCYTES BLD QL SMEAR: SLIGHT
PLAT MORPH BLD: ABNORMAL
PLATELET # BLD AUTO: 455 K/UL
POIKILOCYTOSIS BLD QL SMEAR: SLIGHT
POLYCHROMASIA BLD QL SMEAR: NORMAL
POTASSIUM SERPL-SCNC: 4.3 MMOL/L
PROT SERPL-MCNC: 7.5 G/DL
RBC # BLD: 4.36 M/UL
RBC # FLD: 20.9 %
RBC BLD AUTO: ABNORMAL
SODIUM SERPL-SCNC: 140 MMOL/L
TARGETS BLD QL SMEAR: SLIGHT
TIBC SERPL-MCNC: 372 UG/DL
UIBC SERPL-MCNC: 356 UG/DL
VIT B12 SERPL-MCNC: 1437 PG/ML
WBC # FLD AUTO: 7.87 K/UL

## 2021-07-06 LAB
HGB A MFR BLD: 43.8 %
HGB A2 MFR BLD: 1.6 %
HGB FRACT BLD-IMP: NORMAL
HGB OTHER MFR BLD ELPH: 54.6 %
HGB S BLD QL SOLY: NEGATIVE

## 2021-07-09 ENCOUNTER — APPOINTMENT (OUTPATIENT)
Dept: PULMONOLOGY | Facility: CLINIC | Age: 31
End: 2021-07-09

## 2021-07-12 ENCOUNTER — APPOINTMENT (OUTPATIENT)
Dept: PULMONOLOGY | Facility: CLINIC | Age: 31
End: 2021-07-12
Payer: MEDICAID

## 2021-07-12 VITALS
TEMPERATURE: 97.2 F | HEIGHT: 61 IN | SYSTOLIC BLOOD PRESSURE: 141 MMHG | WEIGHT: 293 LBS | BODY MASS INDEX: 55.32 KG/M2 | HEART RATE: 96 BPM | DIASTOLIC BLOOD PRESSURE: 87 MMHG | OXYGEN SATURATION: 99 %

## 2021-07-12 DIAGNOSIS — Z01.811 ENCOUNTER FOR PREPROCEDURAL RESPIRATORY EXAMINATION: ICD-10-CM

## 2021-07-12 PROCEDURE — 99213 OFFICE O/P EST LOW 20 MIN: CPT

## 2021-07-12 NOTE — REVIEW OF SYSTEMS
[Negative] : Sleep Disorder [Dyspnea] : dyspnea [Difficulty Maintaining Sleep] : difficulty maintaining sleep [Snoring] : snoring

## 2021-07-12 NOTE — HISTORY OF PRESENT ILLNESS
[Difficulty Maintaining Sleep] : difficulty maintaining sleep [Fatigue] : fatigue [Frequent Nocturnal Awakening] : frequent nocturnal awakening [Nonrestorative Sleep] : nonrestorative sleep [Recent  Weight Gain] : recent weight gain [Difficulty Breathing During Exertion] : denies dyspnea on exertion [Feelings Of Weakness On Exertion] : denies exercise intolerance [Cough] : denies coughing [Wheezing] : denies wheezing [Regional Soft Tissue Swelling Both Lower Extremities] : denies lower extremity edema [Chest Pain Or Discomfort] : denies chest pain [Fever] : denies fever [Class I - No Symptoms and No Limitations] : I [Cold] : cold weather [URI] : upper respiratory tract infection [Exercise] : exercise [Smog] : smog [Adherent] : the patient is adherent with ~his/her~ medication regimen [Goals--Doing Well] : the patient is doing well with ~his/her~ goals [Snoring] : snoring [Awakes Unrefreshed] : awakening unrefreshed [Awakening With Dry Mouth] : awakening with dry mouth [Never] : never [Current] : current [Stable] : are stable [1  - Very slight] : 1, very slight [TextBox_4] : 31 yr old female with PMH of Asthma, obesity, Pre DM and iron def anemia.  Presents today for pulmonary clearance for bariatric surgery. \par \par Her asthma is triggered by viral infections and has not been sick over the past year.  She has not needed prednisone in over a year.  Denies wheezing at night, she is rarely using albuterol and has not used in over a year, able to do daily activity and feels her is controlled.  ACT score is 25/25.  She is compliant with Symbicort BID and instructed to wash her mouth out after use. \par \par She denies SOB on flat surfaces but SOB walking up one flight.  She is snoring a night and waking up at night 2-3 times a night to uses the bathroom.  Denies fever, chest pain, wheezing, coughing or edema. \par \par Pt is currently smoking marijuana and cutting down on smoking for surgery.  [Awakes with Dry Mouth] : does not awaken with dry mouth [Tired while Driving] : not tired while driving [TextBox_19] : Moderate NIR with desaturation 9 mins.  [Wt Gain ___ Lbs] : no recent weight gain [Wt Loss ___ Lbs] : no recent weight loss [Oxygen] : the patient uses no supplemental oxygen [Good Control] : peak flow has been poor [More Frequent Use Needed Recently] : Patient reports no recent increase in frequency of [Side Effects] : ~He/She~ denies medication side effects [Witnessed Apneas] : no witnessed sleep apnea [Daytime Somnolence] : no daytime somnolence [Awakes with Headache] : no headache upon awakening

## 2021-07-12 NOTE — REASON FOR VISIT
[Initial] : an initial visit [Pre-op Risk Stratification] : pre-op risk stratification [Asthma] : asthma [TextBox_44] : Bariatric surgery

## 2021-07-12 NOTE — ASSESSMENT
[FreeTextEntry1] : Preop evaluation\par DOUGLAS CANO  is optimized for surgery. she  is to be extubated once fully awake and able to protect airway.  The patient is to be monitored in the recovery room. They might benefit for high flow oxygen or noninvasive ventilation to prevent or reverse atelectasis.  Patient is to be admitted to a monitored bed postoperatively.  Avoid oversedation.  DOUGLAS is high risk for DVT and will require bimodal agents for DVT prophylaxis early mobilization is recommended. she is to use the incentive spirometry postoperative. \par \par Plan\par \par - repeat sleep study and order CPAP prior to surgery\par - CXR\par - Repeat PFT \par \par smoking cessation\par  \par Currently smoking marijuana and currently weaning herself off. Discussed the risk factors with smoking with anesthesia and wound healing with surgery.  Pt is motivated to quit. \par \par Bronchial asthma\par \par I instructed the patient to continue on Symbicort 2 puffs twice a day and albuterol as needed. Patient rarely using her albuterol and has not needed steroids for her asthma in over a year.  Pt denies any recent admission or ER visit in the past year.  I discussed with the patient effect of morbid obesity on asthma and the benefit of weight reduction to control her asthma symptoms. I advised the patient to continue Symbicort on regular basis 2 puffs twice a day. Follow with repeat PFT. The patient was instructed to hold the Symbicort the day of the test.\par \par Obstructive sleep apnea\par \par Home sleep study from 2/2020 showed moderate NIR and 9.3 minutes below 90% oxygen saturation.  Discussed with pt need for treatment pre surgery.  Due to insurance issues we need to repeat home sleep study to get her a CPAP, she never received the CPAP after the last sleep study.  Follow with sleep study and order CPAP.  She is to use pre and post surgery.  Repeat sleep study post weight loss. Discussed weight loss may correct NIR but might continue post weight loss due to anatomy. \par \par \par

## 2021-07-12 NOTE — PHYSICAL EXAM
[General Appearance - Well Developed] : well developed [Normal Appearance] : normal appearance [Well Groomed] : well groomed [General Appearance - Well Nourished] : well nourished [No Deformities] : no deformities [General Appearance - In No Acute Distress] : no acute distress [Normal Conjunctiva] : the conjunctiva exhibited no abnormalities [Eyelids - No Xanthelasma] : the eyelids demonstrated no xanthelasmas [Normal Oropharynx] : normal oropharynx [Neck Appearance] : the appearance of the neck was normal [Neck Cervical Mass (___cm)] : no neck mass was observed [Jugular Venous Distention Increased] : there was no jugular-venous distention [Thyroid Diffuse Enlargement] : the thyroid was not enlarged [Thyroid Nodule] : there were no palpable thyroid nodules [Heart Rate And Rhythm] : heart rate and rhythm were normal [Heart Sounds] : normal S1 and S2 [Murmurs] : no murmurs present [Respiration, Rhythm And Depth] : normal respiratory rhythm and effort [Exaggerated Use Of Accessory Muscles For Inspiration] : no accessory muscle use [Auscultation Breath Sounds / Voice Sounds] : lungs were clear to auscultation bilaterally [Abdomen Soft] : soft [Abdomen Tenderness] : non-tender [Abdomen Mass (___ Cm)] : no abdominal mass palpated [Abnormal Walk] : normal gait [Gait - Sufficient For Exercise Testing] : the gait was sufficient for exercise testing [Skin Color & Pigmentation] : normal skin color and pigmentation [Skin Turgor] : normal skin turgor [] : no rash [Deep Tendon Reflexes (DTR)] : deep tendon reflexes were 2+ and symmetric [Sensation] : the sensory exam was normal to light touch and pinprick [No Focal Deficits] : no focal deficits [Oriented To Time, Place, And Person] : oriented to person, place, and time [Impaired Insight] : insight and judgment were intact [Affect] : the affect was normal

## 2021-07-15 ENCOUNTER — APPOINTMENT (OUTPATIENT)
Dept: RADIOLOGY | Facility: HOSPITAL | Age: 31
End: 2021-07-15
Payer: MEDICAID

## 2021-07-15 ENCOUNTER — APPOINTMENT (OUTPATIENT)
Dept: ULTRASOUND IMAGING | Facility: HOSPITAL | Age: 31
End: 2021-07-15
Payer: MEDICAID

## 2021-07-15 ENCOUNTER — OUTPATIENT (OUTPATIENT)
Dept: OUTPATIENT SERVICES | Facility: HOSPITAL | Age: 31
LOS: 1 days | End: 2021-07-15
Payer: COMMERCIAL

## 2021-07-15 PROCEDURE — 76700 US EXAM ABDOM COMPLETE: CPT | Mod: 26

## 2021-07-15 PROCEDURE — 71046 X-RAY EXAM CHEST 2 VIEWS: CPT | Mod: 26

## 2021-07-15 PROCEDURE — 76700 US EXAM ABDOM COMPLETE: CPT

## 2021-07-15 PROCEDURE — 74240 X-RAY XM UPR GI TRC 1CNTRST: CPT | Mod: 26

## 2021-07-15 PROCEDURE — 74240 X-RAY XM UPR GI TRC 1CNTRST: CPT

## 2021-07-15 PROCEDURE — 71046 X-RAY EXAM CHEST 2 VIEWS: CPT

## 2021-07-19 ENCOUNTER — TRANSCRIPTION ENCOUNTER (OUTPATIENT)
Age: 31
End: 2021-07-19

## 2021-07-20 ENCOUNTER — APPOINTMENT (OUTPATIENT)
Dept: HEART AND VASCULAR | Facility: CLINIC | Age: 31
End: 2021-07-20
Payer: MEDICAID

## 2021-07-20 VITALS
DIASTOLIC BLOOD PRESSURE: 68 MMHG | HEART RATE: 103 BPM | HEIGHT: 61 IN | BODY MASS INDEX: 55.32 KG/M2 | SYSTOLIC BLOOD PRESSURE: 110 MMHG | TEMPERATURE: 97.4 F | OXYGEN SATURATION: 98 % | WEIGHT: 293 LBS

## 2021-07-20 PROCEDURE — 99214 OFFICE O/P EST MOD 30 MIN: CPT

## 2021-07-20 PROCEDURE — 93000 ELECTROCARDIOGRAM COMPLETE: CPT

## 2021-07-20 NOTE — HISTORY OF PRESENT ILLNESS
[FreeTextEntry1] : for cardiac evaluation prior to bariatric surgery\par notes sob, missed prior scheduled evaluation

## 2021-07-21 ENCOUNTER — APPOINTMENT (OUTPATIENT)
Dept: PULMONOLOGY | Facility: CLINIC | Age: 31
End: 2021-07-21
Payer: MEDICAID

## 2021-07-21 PROCEDURE — 94727 GAS DIL/WSHOT DETER LNG VOL: CPT

## 2021-07-21 PROCEDURE — 94729 DIFFUSING CAPACITY: CPT

## 2021-07-21 PROCEDURE — 94060 EVALUATION OF WHEEZING: CPT

## 2021-07-22 ENCOUNTER — TRANSCRIPTION ENCOUNTER (OUTPATIENT)
Age: 31
End: 2021-07-22

## 2021-07-29 ENCOUNTER — APPOINTMENT (OUTPATIENT)
Dept: SLEEP CENTER | Facility: HOME HEALTH | Age: 31
End: 2021-07-29
Payer: MEDICAID

## 2021-07-29 ENCOUNTER — OUTPATIENT (OUTPATIENT)
Dept: OUTPATIENT SERVICES | Facility: HOSPITAL | Age: 31
LOS: 1 days | End: 2021-07-29
Payer: MEDICAID

## 2021-07-29 PROCEDURE — 95800 SLP STDY UNATTENDED: CPT

## 2021-07-29 PROCEDURE — 95800 SLP STDY UNATTENDED: CPT | Mod: 26

## 2021-07-30 DIAGNOSIS — G47.33 OBSTRUCTIVE SLEEP APNEA (ADULT) (PEDIATRIC): ICD-10-CM

## 2021-08-11 ENCOUNTER — APPOINTMENT (OUTPATIENT)
Dept: HEART AND VASCULAR | Facility: CLINIC | Age: 31
End: 2021-08-11
Payer: MEDICAID

## 2021-08-11 VITALS — BODY MASS INDEX: 69.16 KG/M2 | WEIGHT: 293 LBS

## 2021-08-11 VITALS
SYSTOLIC BLOOD PRESSURE: 120 MMHG | OXYGEN SATURATION: 98 % | HEART RATE: 92 BPM | HEIGHT: 61 IN | DIASTOLIC BLOOD PRESSURE: 80 MMHG | BODY MASS INDEX: 55.32 KG/M2 | WEIGHT: 293 LBS | TEMPERATURE: 98 F | RESPIRATION RATE: 12 BRPM

## 2021-08-11 PROCEDURE — 99214 OFFICE O/P EST MOD 30 MIN: CPT | Mod: 25

## 2021-08-11 PROCEDURE — 93306 TTE W/DOPPLER COMPLETE: CPT

## 2021-08-11 PROCEDURE — 93015 CV STRESS TEST SUPVJ I&R: CPT

## 2021-08-17 VITALS — HEIGHT: 61 IN | BODY MASS INDEX: 55.32 KG/M2 | WEIGHT: 293 LBS

## 2021-08-23 LAB
APPEARANCE: ABNORMAL
BACTERIA: ABNORMAL
BILIRUBIN URINE: NEGATIVE
BLOOD URINE: NEGATIVE
COLOR: NORMAL
GLUCOSE QUALITATIVE U: NEGATIVE
HYALINE CASTS: 1 /LPF
KETONES URINE: NEGATIVE
LEUKOCYTE ESTERASE URINE: NEGATIVE
MICROSCOPIC-UA: NORMAL
NITRITE URINE: NEGATIVE
PH URINE: 7
PROTEIN URINE: NORMAL
RED BLOOD CELLS URINE: 1 /HPF
SPECIFIC GRAVITY URINE: 1.02
SQUAMOUS EPITHELIAL CELLS: >27 /HPF
UROBILINOGEN URINE: NORMAL
WHITE BLOOD CELLS URINE: 4 /HPF

## 2021-09-11 VITALS — WEIGHT: 293 LBS | BODY MASS INDEX: 55.32 KG/M2 | HEIGHT: 61 IN

## 2021-12-06 ENCOUNTER — APPOINTMENT (OUTPATIENT)
Dept: BARIATRICS | Facility: CLINIC | Age: 31
End: 2021-12-06

## 2021-12-07 ENCOUNTER — NON-APPOINTMENT (OUTPATIENT)
Age: 31
End: 2021-12-07

## 2023-08-04 NOTE — REVIEW OF SYSTEMS
[SOB] : shortness of breath [Joint Pain] : joint pain [Joint Stiffness] : joint stiffness [Negative] : Heme/Lymph Epidermal Closure: running

## 2023-08-30 ENCOUNTER — APPOINTMENT (OUTPATIENT)
Dept: BARIATRICS | Facility: CLINIC | Age: 33
End: 2023-08-30
Payer: COMMERCIAL

## 2023-08-30 VITALS
BODY MASS INDEX: 55.32 KG/M2 | DIASTOLIC BLOOD PRESSURE: 83 MMHG | TEMPERATURE: 97.3 F | WEIGHT: 293 LBS | OXYGEN SATURATION: 100 % | HEIGHT: 61 IN | SYSTOLIC BLOOD PRESSURE: 133 MMHG | HEART RATE: 86 BPM

## 2023-08-30 DIAGNOSIS — R73.03 PREDIABETES.: ICD-10-CM

## 2023-08-30 PROCEDURE — 99213 OFFICE O/P EST LOW 20 MIN: CPT

## 2023-08-30 NOTE — PLAN
[FreeTextEntry1] : Have ordered lab work and UGI series. Will send her to obtain CPAP machine and proceed to [facility].

## 2023-08-30 NOTE — HISTORY OF PRESENT ILLNESS
[de-identified] : Ms. Randa Kahn comes in today. She is a 33-year-old F patient with a BMI of 67, early diabetes, NIR, arthritis and asthma. Discussed all the options of bariatric surgery with the patient and all the risks and benefits. Importance of maintaining a healthy lifestyle with a healthy diet and exercise both pre and post bariatric surgery emphasized. At this time, have ordered lab work and UGI series. Will send her for sleep study and CPAP machine and obtain any necessary clearances. With a BMI of 67, believe patient will benefit from a stomach and intestine kind of operation. Will proceed to MDS and will begin the bariatric workup.

## 2023-08-30 NOTE — END OF VISIT
[FreeTextEntry3] : All medical record entries made by the Scribe were at my, MORIS Lock , direction and personally dictated by me on 08/30/2023 . I have reviewed the chart and agree that the record accurately reflects my personal performance of the history, physical exam, assessment and plan. I have also personally directed, reviewed, and agreed with the chart.

## 2023-09-05 DIAGNOSIS — E55.9 VITAMIN D DEFICIENCY, UNSPECIFIED: ICD-10-CM

## 2023-09-05 LAB
25(OH)D3 SERPL-MCNC: 18.2 NG/ML
A-TOCOPHEROL VIT E SERPL-MCNC: 7.1 MG/L
ALBUMIN SERPL ELPH-MCNC: 3.9 G/DL
ALP BLD-CCNC: 76 U/L
ALT SERPL-CCNC: 19 U/L
ANION GAP SERPL CALC-SCNC: 11 MMOL/L
APO B SERPL-MCNC: 102 MG/DL
AST SERPL-CCNC: 20 U/L
BETA+GAMMA TOCOPHEROL SERPL-MCNC: 2.7 MG/L
BILIRUB SERPL-MCNC: <0.2 MG/DL
BUN SERPL-MCNC: 11 MG/DL
CALCIUM SERPL-MCNC: 8.9 MG/DL
CALCIUM SERPL-MCNC: 8.9 MG/DL
CHLORIDE SERPL-SCNC: 103 MMOL/L
CHOLEST SERPL-MCNC: 167 MG/DL
CO2 SERPL-SCNC: 25 MMOL/L
CREAT SERPL-MCNC: 0.67 MG/DL
CRP SERPL-MCNC: 26 MG/L
EGFR: 118 ML/MIN/1.73M2
ESTIMATED AVERAGE GLUCOSE: 137 MG/DL
FOLATE SERPL-MCNC: 8 NG/ML
GLUCOSE SERPL-MCNC: 87 MG/DL
HBA1C MFR BLD HPLC: 6.4 %
HDLC SERPL-MCNC: 39 MG/DL
IGF BP1 SERPL-MCNC: 103 NG/ML
INSULIN SERPL-MCNC: 47 UU/ML
IRON SERPL-MCNC: 17 UG/DL
LACTATE BLDA-MCNC: 0.9 MMOL/L
LDLC SERPL CALC-MCNC: 112 MG/DL
NONHDLC SERPL-MCNC: 127 MG/DL
PARATHYROID HORMONE INTACT: 41 PG/ML
POTASSIUM SERPL-SCNC: 4.3 MMOL/L
PREALB SERPL NEPH-MCNC: 16 MG/DL
PROT SERPL-MCNC: 6.8 G/DL
SODIUM SERPL-SCNC: 139 MMOL/L
TRIGL SERPL-MCNC: 81 MG/DL
TSH SERPL-ACNC: 2.62 UIU/ML
VIT A SERPL-MCNC: 24.1 UG/DL
VIT B1 SERPL-MCNC: 79.5 NMOL/L
VIT B12 SERPL-MCNC: 712 PG/ML

## 2023-09-06 ENCOUNTER — APPOINTMENT (OUTPATIENT)
Dept: PULMONOLOGY | Facility: CLINIC | Age: 33
End: 2023-09-06
Payer: COMMERCIAL

## 2023-09-06 ENCOUNTER — OUTPATIENT (OUTPATIENT)
Dept: OUTPATIENT SERVICES | Facility: HOSPITAL | Age: 33
LOS: 1 days | End: 2023-09-06
Payer: COMMERCIAL

## 2023-09-06 VITALS
HEIGHT: 61 IN | HEART RATE: 100 BPM | WEIGHT: 293 LBS | BODY MASS INDEX: 55.32 KG/M2 | OXYGEN SATURATION: 98 % | DIASTOLIC BLOOD PRESSURE: 87 MMHG | TEMPERATURE: 98.1 F | SYSTOLIC BLOOD PRESSURE: 130 MMHG

## 2023-09-06 PROCEDURE — 99203 OFFICE O/P NEW LOW 30 MIN: CPT

## 2023-09-06 PROCEDURE — 71046 X-RAY EXAM CHEST 2 VIEWS: CPT | Mod: 26

## 2023-09-06 PROCEDURE — 71046 X-RAY EXAM CHEST 2 VIEWS: CPT

## 2023-09-06 RX ORDER — ALBUTEROL SULFATE 90 UG/1
108 (90 BASE) INHALANT RESPIRATORY (INHALATION)
Qty: 1 | Refills: 11 | Status: ACTIVE | COMMUNITY
Start: 2023-09-06 | End: 1900-01-01

## 2023-09-06 NOTE — PHYSICAL EXAM
[No Acute Distress] : no acute distress [Normal Oropharynx] : normal oropharynx [Normal Appearance] : normal appearance [Normal Rate/Rhythm] : normal rate/rhythm [Normal S1, S2] : normal s1, s2 [No Murmurs] : no murmurs [No Resp Distress] : no resp distress [Clear to Auscultation Bilaterally] : clear to auscultation bilaterally [No Abnormalities] : no abnormalities [Benign] : benign [Normal Gait] : normal gait [No Clubbing] : no clubbing [No Cyanosis] : no cyanosis [No Edema] : no edema [FROM] : FROM [Normal Color/ Pigmentation] : normal color/ pigmentation [No Focal Deficits] : no focal deficits [Oriented x3] : oriented x3 [Normal Affect] : normal affect

## 2023-09-07 NOTE — ASSESSMENT
[FreeTextEntry1] : Labs: 2021 WBC 7.87 (), Hgb 10.1, Plts 455 2023 Na 139, K 4.3, Cl 103, HCO3 25, BUN/creat 11/0.67, glucose 87 Tbili 0.2, AST/ALT 20/19, Alk phos 76, TP/Albumin 6.8/3.9  CXR (9/6/23): Impression: No acute abnormality visualized.  PFTs           2021 FEV1/FVC   72% FEV1           1.80, 73% FVC              2.49, 87% TLC              3.70, 83% RV                1.22, 94% DLCO           15.21, 61% -Positive bronchodilator response  ACT: 9/6/23:    24  ESS:  9/6/23:      19  Home sleep study (2021): pAHI 35.6, virgie SpO2 81%; time spent <88%: 2.2%  Canet score: 15 (low (1.6%) risk of perioperative pulmonary complication).  A/P:  34 yo F h/o childhood asthma and obesity c/b NIR is evaluated for perioperative risk assessment.  Ms. Kahn has had 2 moderate/severe asthma exacerbations in the last 2 years, both in the setting of COVID. She has minimal symptoms when she is not ill and is not using rescue nor controller medication with any regularity now. Given her severe exacerbation in 1/2023, I recommended that she utilize her Symbicort on a scheduled basis now to optimize lung function and reduce her risk of exacerbation. After her surgery, she is a good candidate to transition to SMART Symbicort therapy. She would also benefit from total smoking cessation (both tobacco and marijuana).   For her risk assessment, her Canet/Ariscat score is low. She only receives points for her abdominal surgery. Opportunities to optimize her management include re-initiation of LABA/ICS, obtaining PFTs, and initiation of CPAP. I would also like to obtain another CXR.  For her NIR, she has severe sleep apnea as per her 2021 home sleep study. We discussed the pathophysiology and symptoms associated with NIR. I suspect her current fatigue and sleepiness are related to untreated sleep apnea. She is amenable to trying CPAP. I will request Auto-PAP 4-20.  1. Childhood asthma, well-controlled 2. COVID infection in 1/2023 with asthma exacerbatoin 3. Obesity 4. Severe NIR  -re-start Symbicort 80/4.5, 2 puffs BID and PRN Albuterol -discussed benefits of regular physical activity, avoiding asthma triggers, and total smoking cessation -CXR and repeat PFTs -will order auto-PAP 4-20 -follow-up with me in 1-2 months

## 2023-09-07 NOTE — HISTORY OF PRESENT ILLNESS
[Former] : former [TextBox_4] : I saw Ms. Randa Kahn today in perioperative risk assessment. In review, Ms. Kahn is a 32 yo F h/o childhood asthma and obesity c/b NIR, pre-DM, and OA who was previously evaluated in our clinic for perioperative risk assessment (2021). Her evaluation included PFTs (mild obstruction, reduced DLCO, + bronchodilator response) and a home sleep study showing severe NIR. She ultimately did not pursue surgery.  Since our last visit, Ms. Kahn has been re-evaluated by Dr. Campo for weight loss surgery.  Today, Ms. Kahn affirms the above. She overall feels well and describes asthma since childhood. She was hospitalized 2 years ago for an asthma exacerbation in the setting of COVID pneumonia (did not require mechanical ventilation). Her last exacerbation was in 1/2023, again in the setting of COVID; she improved with a Prednisone course. Her asthma treatment includes Albuterol and Symbicort, though she has not used either recently. Her last Albuterol use was 2-3 weeks prior, and Symbicort was 4-5 months prior (after her ER visit). She quit smoking cigarettes 3-4 months ago. Exacerbating factors include URIs, dust, and cold weather. Alleviating factors include inhaler use. In terms of asthma, she denies daytime symptoms, night-time awakenings, and activity limitation. 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 She tries to walk daily for exercise, though she is limited by fatigue (which she associates with her weight). She has had difficulty losing weight via diet alone.  Ms. Kahn never pursued CPAP after her sleep study in 2021. Today, she reports unrefreshing sleep, daytime fatigue, and taking naps several times/week.   PMH: Childhood asthma Anemia NIR, severe Pre-DM OA  FH: Maternal grandparent with breast cancer Mom with HTN  SH: Ms. Kahn lives in [facility]. She works for the [employer].   She smoked 2 cigarettes/day for 8 years (0.8 pack-years); she quit smoking in 2023. She denies vaping. She drinks alcohol 1-2X/month. She smokes marijuana 2-3 times/week.  Ms. Kahn endorses dust exposure at work and at home. She denies exposure to Asbestos, Tuberculosis, mold, smoke, fumes, heavy metals, Beryllium, birds, feathers, or hot tubs.  [YearQuit] : 2023

## 2023-09-07 NOTE — ADDENDUM
[FreeTextEntry1] : Addendum ([facility]; 9/6/23): I called Ms. Kahn about her CXR: Impression: No acute abnormality visualized.  We placed the DME request for CPAP. She is awaiting her prescriptions to be filled as well as scheduling of her PFTs.

## 2023-09-07 NOTE — CONSULT LETTER
[Dear  ___] : Dear  [unfilled], [Consult Letter:] : I had the pleasure of evaluating your patient, [unfilled]. [Please see my note below.] : Please see my note below. [Consult Closing:] : Thank you very much for allowing me to participate in the care of this patient.  If you have any questions, please do not hesitate to contact me. [Sincerely,] : Sincerely, [DrShoaib  ___] : Dr. CERDA [FreeTextEntry2] : Alber [FreeTextEntry1] : Dr. Campo- I saw Ms. Kahn today in perioperative evaluation. I think she would benefit from re-initiation of her controller inhaler and initiation of CPAP. She was evaluated in 2021; I think repeat PFTs and a CXR would be worthwhile. I will see her back in 1-2 months.  Please call me with additional thoughts.  Arnulfo Rivas C:  [FreeTextEntry3] : Arnulfo Rivas

## 2023-09-11 ENCOUNTER — APPOINTMENT (OUTPATIENT)
Dept: BARIATRICS | Facility: CLINIC | Age: 33
End: 2023-09-11

## 2023-09-12 ENCOUNTER — APPOINTMENT (OUTPATIENT)
Dept: BARIATRICS | Facility: CLINIC | Age: 33
End: 2023-09-12
Payer: COMMERCIAL

## 2023-09-12 VITALS — WEIGHT: 293 LBS | BODY MASS INDEX: 67.27 KG/M2

## 2023-09-12 PROCEDURE — 98968 PH1 ASSMT&MGMT NQHP 21-30: CPT

## 2023-09-18 LAB — ZINC SERPL-MCNC: 63 UG/DL

## 2023-09-28 ENCOUNTER — APPOINTMENT (OUTPATIENT)
Dept: RADIOLOGY | Facility: HOSPITAL | Age: 33
End: 2023-09-28
Payer: COMMERCIAL

## 2023-09-28 ENCOUNTER — OUTPATIENT (OUTPATIENT)
Dept: OUTPATIENT SERVICES | Facility: HOSPITAL | Age: 33
LOS: 1 days | End: 2023-09-28
Payer: COMMERCIAL

## 2023-09-28 PROCEDURE — 74240 X-RAY XM UPR GI TRC 1CNTRST: CPT | Mod: 26

## 2023-09-28 PROCEDURE — 74240 X-RAY XM UPR GI TRC 1CNTRST: CPT

## 2023-10-04 ENCOUNTER — OUTPATIENT (OUTPATIENT)
Dept: OUTPATIENT SERVICES | Facility: HOSPITAL | Age: 33
LOS: 1 days | End: 2023-10-04
Payer: COMMERCIAL

## 2023-10-04 ENCOUNTER — APPOINTMENT (OUTPATIENT)
Dept: HEART AND VASCULAR | Facility: CLINIC | Age: 33
End: 2023-10-04
Payer: COMMERCIAL

## 2023-10-04 ENCOUNTER — NON-APPOINTMENT (OUTPATIENT)
Age: 33
End: 2023-10-04

## 2023-10-04 ENCOUNTER — APPOINTMENT (OUTPATIENT)
Dept: PULMONOLOGY | Facility: CLINIC | Age: 33
End: 2023-10-04
Payer: COMMERCIAL

## 2023-10-04 VITALS
HEIGHT: 61 IN | OXYGEN SATURATION: 97 % | DIASTOLIC BLOOD PRESSURE: 83 MMHG | BODY MASS INDEX: 55.32 KG/M2 | HEART RATE: 83 BPM | SYSTOLIC BLOOD PRESSURE: 120 MMHG | WEIGHT: 293 LBS | TEMPERATURE: 98.5 F

## 2023-10-04 VITALS
OXYGEN SATURATION: 97 % | SYSTOLIC BLOOD PRESSURE: 120 MMHG | BODY MASS INDEX: 55.32 KG/M2 | HEIGHT: 61 IN | TEMPERATURE: 98.4 F | DIASTOLIC BLOOD PRESSURE: 80 MMHG | HEART RATE: 92 BPM | WEIGHT: 293 LBS

## 2023-10-04 DIAGNOSIS — J45.909 UNSPECIFIED ASTHMA, UNCOMPLICATED: ICD-10-CM

## 2023-10-04 PROCEDURE — 94729 DIFFUSING CAPACITY: CPT | Mod: 26

## 2023-10-04 PROCEDURE — 94618 PULMONARY STRESS TESTING: CPT | Mod: 26

## 2023-10-04 PROCEDURE — 93000 ELECTROCARDIOGRAM COMPLETE: CPT | Mod: NC

## 2023-10-04 PROCEDURE — 94618 PULMONARY STRESS TESTING: CPT

## 2023-10-04 PROCEDURE — 94760 N-INVAS EAR/PLS OXIMETRY 1: CPT

## 2023-10-04 PROCEDURE — 94729 DIFFUSING CAPACITY: CPT

## 2023-10-04 PROCEDURE — 94726 PLETHYSMOGRAPHY LUNG VOLUMES: CPT | Mod: 26

## 2023-10-04 PROCEDURE — 94726 PLETHYSMOGRAPHY LUNG VOLUMES: CPT

## 2023-10-04 PROCEDURE — 94060 EVALUATION OF WHEEZING: CPT

## 2023-10-04 PROCEDURE — 94010 BREATHING CAPACITY TEST: CPT | Mod: 26

## 2023-10-04 PROCEDURE — 99213 OFFICE O/P EST LOW 20 MIN: CPT

## 2023-10-04 PROCEDURE — 99214 OFFICE O/P EST MOD 30 MIN: CPT | Mod: 25

## 2023-10-04 RX ORDER — BUDESONIDE AND FORMOTEROL FUMARATE DIHYDRATE 80; 4.5 UG/1; UG/1
80-4.5 AEROSOL RESPIRATORY (INHALATION) TWICE DAILY
Qty: 1 | Refills: 6 | Status: DISCONTINUED | COMMUNITY
Start: 2023-09-06 | End: 2023-10-04

## 2023-10-04 RX ORDER — BUDESONIDE AND FORMOTEROL FUMARATE DIHYDRATE 160; 4.5 UG/1; UG/1
160-4.5 AEROSOL RESPIRATORY (INHALATION) TWICE DAILY
Qty: 1 | Refills: 6 | Status: ACTIVE | COMMUNITY
Start: 2023-10-04 | End: 1900-01-01

## 2023-10-20 ENCOUNTER — APPOINTMENT (OUTPATIENT)
Dept: BARIATRICS | Facility: CLINIC | Age: 33
End: 2023-10-20

## 2023-10-20 VITALS — BODY MASS INDEX: 66.32 KG/M2 | WEIGHT: 293 LBS

## 2023-10-23 ENCOUNTER — RX RENEWAL (OUTPATIENT)
Age: 33
End: 2023-10-23

## 2023-10-31 ENCOUNTER — APPOINTMENT (OUTPATIENT)
Dept: SLEEP CENTER | Facility: HOME HEALTH | Age: 33
End: 2023-10-31
Payer: COMMERCIAL

## 2023-10-31 ENCOUNTER — OUTPATIENT (OUTPATIENT)
Dept: OUTPATIENT SERVICES | Facility: HOSPITAL | Age: 33
LOS: 1 days | End: 2023-10-31
Payer: COMMERCIAL

## 2023-10-31 PROCEDURE — 95800 SLP STDY UNATTENDED: CPT

## 2023-10-31 PROCEDURE — 95800 SLP STDY UNATTENDED: CPT | Mod: 26

## 2023-11-08 ENCOUNTER — APPOINTMENT (OUTPATIENT)
Dept: PULMONOLOGY | Facility: CLINIC | Age: 33
End: 2023-11-08
Payer: COMMERCIAL

## 2023-11-08 ENCOUNTER — LABORATORY RESULT (OUTPATIENT)
Age: 33
End: 2023-11-08

## 2023-11-08 VITALS
HEART RATE: 86 BPM | HEIGHT: 62.99 IN | SYSTOLIC BLOOD PRESSURE: 138 MMHG | OXYGEN SATURATION: 94 % | TEMPERATURE: 98 F | BODY MASS INDEX: 51.91 KG/M2 | WEIGHT: 293 LBS | DIASTOLIC BLOOD PRESSURE: 90 MMHG

## 2023-11-08 DIAGNOSIS — G47.33 OBSTRUCTIVE SLEEP APNEA (ADULT) (PEDIATRIC): ICD-10-CM

## 2023-11-08 DIAGNOSIS — Z72.0 TOBACCO USE: ICD-10-CM

## 2023-11-08 PROCEDURE — 99203 OFFICE O/P NEW LOW 30 MIN: CPT

## 2023-11-08 PROCEDURE — 99213 OFFICE O/P EST LOW 20 MIN: CPT

## 2023-11-08 RX ORDER — MONTELUKAST 10 MG/1
10 TABLET, FILM COATED ORAL
Qty: 30 | Refills: 3 | Status: ACTIVE | COMMUNITY
Start: 2023-11-08 | End: 1900-01-01

## 2023-11-08 RX ORDER — MONTELUKAST 10 MG/1
10 TABLET, FILM COATED ORAL
Qty: 30 | Refills: 3 | Status: DISCONTINUED | COMMUNITY
Start: 2023-11-08 | End: 2023-11-08

## 2023-11-08 RX ORDER — PREDNISONE 20 MG/1
20 TABLET ORAL DAILY
Qty: 10 | Refills: 0 | Status: ACTIVE | COMMUNITY
Start: 2023-11-08 | End: 1900-01-01

## 2023-11-08 RX ORDER — NICOTINE 4 MG/1
10 INHALANT RESPIRATORY (INHALATION)
Qty: 168 | Refills: 3 | Status: ACTIVE | COMMUNITY
Start: 2023-11-08 | End: 1900-01-01

## 2023-11-09 LAB
RAPID RVP RESULT: NOT DETECTED
SARS-COV-2 RNA PNL RESP NAA+PROBE: NOT DETECTED

## 2023-11-13 LAB
A ALTERNATA IGE QN: 0.16 KUA/L
A FUMIGATUS IGE QN: 0.4 KUA/L
C ALBICANS IGE QN: 0.75 KUA/L
C HERBARUM IGE QN: 0.25 KUA/L
CAT DANDER IGE QN: 1.69 KUA/L
COMMON RAGWEED IGE QN: <0.1 KUA/L
D FARINAE IGE QN: 0.12 KUA/L
D PTERONYSS IGE QN: 0.13 KUA/L
DEPRECATED A ALTERNATA IGE RAST QL: NORMAL
DEPRECATED A FUMIGATUS IGE RAST QL: 1
DEPRECATED C ALBICANS IGE RAST QL: 2
DEPRECATED C HERBARUM IGE RAST QL: NORMAL
DEPRECATED CAT DANDER IGE RAST QL: 2
DEPRECATED COMMON RAGWEED IGE RAST QL: 0
DEPRECATED D FARINAE IGE RAST QL: NORMAL
DEPRECATED D PTERONYSS IGE RAST QL: NORMAL
DEPRECATED DOG DANDER IGE RAST QL: 2
DEPRECATED M RACEMOSUS IGE RAST QL: 0
DEPRECATED ROACH IGE RAST QL: 2
DEPRECATED TIMOTHY IGE RAST QL: 0
DEPRECATED WHITE OAK IGE RAST QL: 0
DOG DANDER IGE QN: 1.33 KUA/L
M RACEMOSUS IGE QN: <0.1 KUA/L
ROACH IGE QN: 2.08 KUA/L
TIMOTHY IGE QN: <0.1 KUA/L
TOTAL IGE SMQN RAST: 239 KU/L
WHITE OAK IGE QN: <0.1 KUA/L

## 2023-11-16 ENCOUNTER — APPOINTMENT (OUTPATIENT)
Dept: HEART AND VASCULAR | Facility: CLINIC | Age: 33
End: 2023-11-16
Payer: COMMERCIAL

## 2023-11-16 DIAGNOSIS — Z01.810 ENCOUNTER FOR PREPROCEDURAL CARDIOVASCULAR EXAMINATION: ICD-10-CM

## 2023-11-16 DIAGNOSIS — R06.02 SHORTNESS OF BREATH: ICD-10-CM

## 2023-11-16 PROCEDURE — 99214 OFFICE O/P EST MOD 30 MIN: CPT | Mod: 25

## 2023-11-16 PROCEDURE — 93306 TTE W/DOPPLER COMPLETE: CPT

## 2023-11-16 PROCEDURE — 93015 CV STRESS TEST SUPVJ I&R: CPT

## 2023-11-20 ENCOUNTER — RX RENEWAL (OUTPATIENT)
Age: 33
End: 2023-11-20

## 2023-12-13 ENCOUNTER — RX RENEWAL (OUTPATIENT)
Age: 33
End: 2023-12-13

## 2023-12-13 RX ORDER — ERGOCALCIFEROL 1.25 MG/1
1.25 MG CAPSULE, LIQUID FILLED ORAL
Qty: 12 | Refills: 0 | Status: ACTIVE | COMMUNITY
Start: 2023-09-05 | End: 1900-01-01

## 2024-01-10 ENCOUNTER — APPOINTMENT (OUTPATIENT)
Dept: PULMONOLOGY | Facility: CLINIC | Age: 34
End: 2024-01-10
Payer: COMMERCIAL

## 2024-01-10 VITALS
DIASTOLIC BLOOD PRESSURE: 84 MMHG | OXYGEN SATURATION: 97 % | WEIGHT: 293 LBS | BODY MASS INDEX: 51.91 KG/M2 | HEIGHT: 62.99 IN | HEART RATE: 83 BPM | TEMPERATURE: 97.4 F | SYSTOLIC BLOOD PRESSURE: 122 MMHG

## 2024-01-10 DIAGNOSIS — J45.909 UNSPECIFIED ASTHMA, UNCOMPLICATED: ICD-10-CM

## 2024-01-10 DIAGNOSIS — G47.33 OBSTRUCTIVE SLEEP APNEA (ADULT) (PEDIATRIC): ICD-10-CM

## 2024-01-10 PROCEDURE — 99213 OFFICE O/P EST LOW 20 MIN: CPT | Mod: 25

## 2024-01-10 PROCEDURE — 99406 BEHAV CHNG SMOKING 3-10 MIN: CPT

## 2024-01-10 RX ORDER — NICOTINE POLACRILEX 4 MG/1
4 GUM, CHEWING BUCCAL
Qty: 168 | Refills: 3 | Status: ACTIVE | COMMUNITY
Start: 2024-01-10 | End: 1900-01-01

## 2024-01-10 NOTE — REVIEW OF SYSTEMS
[Fatigue] : fatigue [A.M. Dry Mouth] : a.m. dry mouth [Negative] : Neurologic [Cough] : no cough [Chest Tightness] : no chest tightness [Sputum] : no sputum [Dyspnea] : no dyspnea [SOB on Exertion] : no sob on exertion

## 2024-01-10 NOTE — ASSESSMENT
[FreeTextEntry1] : Labs: 2021 - WBC 7.87 (), Hgb 10.1, Plts 455  2023- Na 139, K 4.3, Cl 103, HCO3 25, BUN/creat 11/0.67, glucose 87 Tbili 0.2, AST/ALT 20/19, Alk phos 76, TP/Albumin 6.8/3.9  IgE - 256 RAST +cats, dogs, aspergillus, candida, cockroach  CXR (9/6/23): Impression: No acute abnormality visualized.  PFTs 2021 2023 FEV1/FVC 72% 71% FEV1 1.80, 73% 1.81, 71% FVC 2.49, 87% 2.57, 86% TLC 3.70, 83% 4.02, 83% RV 1.22, 94% 1.39, 123% DLCO 15.21, 61% 16.29, 78% -Positive bronchodilator response in 10/2023  ACT: 9/6/23: 24  ESS: 9/6/23: 19 1/10/24: 9  Home sleep study 2021: pAHI 35.6, virgie SpO2 81%; time spent <88%: 2.2% 2023: AHI 49, virgie SpO2 70%  Canet score: 15 (low (1.6%) risk of perioperative pulmonary complication).  A/P: 34 yo F h/o childhood asthma and obesity c/b severe NIR returns to clinic for perioperative risk assessment for bariatric surgery and follow up of NIR and asthma.  Ms. Kahn is doing well today. With CPAP, her ESS is significantly improved. If her adherence report is favorable, I would not have objections to returning to surgery.  For her asthma, she is not having exacerbations, and her symptom burden is low. She has cut down on marijuana smoking. She is still smoking tobacco occasionally; she would like to try Nicotine gum.  1. Moderate persistent asthma with recent exacerbations (9/27/23, 11/2023) 2. COVID infection in 1/2023 with asthma exacerbation 3. Obesity 4. Severe NIR 5. Tobacco use 6. Marijuana use  -continue Symbicort to 160 mcg/4.5 mcg/inh, 2 puffs BID; PRN Albuterol -Montelukast 10 mg PO qHS (she denies history of depression, SI, or HI) -will check CBC at next visit to obtain Abs eos count -recommended continuing total smoking cessation; ordered nicotine gum -encouraged marijuana cessation -continue autoPAP (4-20 cmH2O) request adherence report from DME -for concerns of leak and mask fitting, will request sleep clinic mask fitting acclimation visit -weight loss encouraged -Vaccinations: not discuss today -follow-up with me in 3 months with PFTs

## 2024-01-10 NOTE — HISTORY OF PRESENT ILLNESS
[Former] : former [Current] : current [TextBox_4] : Ms. Kahn is a 34 yo F h/o childhood asthma and obesity c/b NIR, pre-DM, and OA who was previously evaluated in our clinic for perioperative risk assessment (2021). Her evaluation included PFTs (mild obstruction, reduced DLCO, + bronchodilator response) and a home sleep study showing severe NIR (AHI of 35.6). At the time, Auto-PAP was prescribed but not utilized. Our evaluation has included CXR, PFTs, and sleep study.  10/2023: we re-ordered a sleep study, increased LABA/ICS dose due to a recent exacerbation and discussed total smoking cessation  11/8/2023: Ms. Kahn is feeling sick (rhinorrhea, wheezing, cough w/ clear sputum). After increasing her Symbicort dosing, she felt "really well" for a while. She smokes marijuana 1-2 times/week. RVP done negative, asthma profile + cat, dog, cockroach, aspergillus, candida. Recommended indoor air filter. IgE 239. Sleep study showed AHI of 49 severe NIR so autopap was prescribed again  1/2024: using cpap x 1 month, consistent use aside form missing it 2x/week. ESS 9 today. overall more energy during daytime with some perceived benefit from CPAP. Only having issues with mask comfort and leaks. Will have our sleep personel to mask study to see if there is a better fit for her. ALso reports dry mouth. She is not humidfying her air so instructed her on that. For her asthma, she is doing well on her current regimen of Symbicort BID and PRN albuterol (2x/week). She still has marijuana use 2x/week. not smoking. no steroid or abx use in the last few months.   PMH: Childhood asthma Anemia NIR, severe Pre-DM OA  FH: Maternal grandparent with breast cancer Mom with HTN  SH: Ms. Kahn lives [place] [facility]. She works for the [employer].  She smoked 2 cigarettes/day for 8 years (0.8 pack-years); she has quit that in 2023. She denies vaping. She drinks alcohol 1-2X/month. She smokes marijuana 2-3 times/week.  [TextBox_27] : 2x/week

## 2024-01-10 NOTE — PHYSICAL EXAM
[No Acute Distress] : no acute distress [Normal Oropharynx] : normal oropharynx [Normal Appearance] : normal appearance [No Neck Mass] : no neck mass [Normal Rate/Rhythm] : normal rate/rhythm [Normal S1, S2] : normal s1, s2 [No Murmurs] : no murmurs [No Resp Distress] : no resp distress [No Abnormalities] : no abnormalities [Benign] : benign [Normal Gait] : normal gait [No Clubbing] : no clubbing [No Edema] : no edema [Normal Color/ Pigmentation] : normal color/ pigmentation [No Focal Deficits] : no focal deficits [Oriented x3] : oriented x3 [Normal Affect] : normal affect [TextBox_11] : crowding [TextBox_68] : faint exp wheezes left upper lobe; improved after coughing.

## 2024-02-17 ENCOUNTER — APPOINTMENT (OUTPATIENT)
Dept: SLEEP CENTER | Facility: HOSPITAL | Age: 34
End: 2024-02-17

## 2024-04-17 ENCOUNTER — LABORATORY RESULT (OUTPATIENT)
Age: 34
End: 2024-04-17

## 2024-04-17 ENCOUNTER — APPOINTMENT (OUTPATIENT)
Dept: PULMONOLOGY | Facility: CLINIC | Age: 34
End: 2024-04-17
Payer: COMMERCIAL

## 2024-04-17 ENCOUNTER — RESULT REVIEW (OUTPATIENT)
Age: 34
End: 2024-04-17

## 2024-04-17 ENCOUNTER — OUTPATIENT (OUTPATIENT)
Dept: OUTPATIENT SERVICES | Facility: HOSPITAL | Age: 34
LOS: 1 days | End: 2024-04-17
Payer: COMMERCIAL

## 2024-04-17 VITALS
TEMPERATURE: 97.3 F | DIASTOLIC BLOOD PRESSURE: 71 MMHG | HEIGHT: 62.99 IN | WEIGHT: 293 LBS | BODY MASS INDEX: 51.91 KG/M2 | RESPIRATION RATE: 12 BRPM | HEART RATE: 83 BPM | SYSTOLIC BLOOD PRESSURE: 110 MMHG | OXYGEN SATURATION: 95 %

## 2024-04-17 PROCEDURE — 94729 DIFFUSING CAPACITY: CPT

## 2024-04-17 PROCEDURE — 94060 EVALUATION OF WHEEZING: CPT

## 2024-04-17 PROCEDURE — 94618 PULMONARY STRESS TESTING: CPT

## 2024-04-17 PROCEDURE — 99213 OFFICE O/P EST LOW 20 MIN: CPT | Mod: 25

## 2024-04-17 PROCEDURE — ZZZZZ: CPT

## 2024-04-17 PROCEDURE — 71046 X-RAY EXAM CHEST 2 VIEWS: CPT | Mod: 26

## 2024-04-17 PROCEDURE — 94727 GAS DIL/WSHOT DETER LNG VOL: CPT

## 2024-04-17 PROCEDURE — 71046 X-RAY EXAM CHEST 2 VIEWS: CPT

## 2024-04-17 RX ORDER — SEMAGLUTIDE 0.5 MG/.5ML
0.5 INJECTION, SOLUTION SUBCUTANEOUS
Qty: 1 | Refills: 0 | Status: DISCONTINUED | COMMUNITY
Start: 2023-08-30 | End: 2024-04-17

## 2024-04-18 LAB
BASOPHILS # BLD AUTO: 0.03 K/UL
BASOPHILS NFR BLD AUTO: 0.4 %
EOSINOPHIL # BLD AUTO: 0.44 K/UL
EOSINOPHIL NFR BLD AUTO: 6.1 %
HCT VFR BLD CALC: 36 %
HGB BLD-MCNC: 10.7 G/DL
IMM GRANULOCYTES NFR BLD AUTO: 0.4 %
LYMPHOCYTES # BLD AUTO: 1.9 K/UL
LYMPHOCYTES NFR BLD AUTO: 26.2 %
MAN DIFF?: NORMAL
MCHC RBC-ENTMCNC: 23.7 PG
MCHC RBC-ENTMCNC: 29.7 GM/DL
MCV RBC AUTO: 79.8 FL
MONOCYTES # BLD AUTO: 0.52 K/UL
MONOCYTES NFR BLD AUTO: 7.2 %
NEUTROPHILS # BLD AUTO: 4.32 K/UL
NEUTROPHILS NFR BLD AUTO: 59.7 %
PLATELET # BLD AUTO: 404 K/UL
RBC # BLD: 4.51 M/UL
RBC # FLD: 20.2 %
WBC # FLD AUTO: 7.24 K/UL

## 2024-04-18 NOTE — ADDENDUM
[FreeTextEntry1] : Addendum (Evelyn; 4/18/24): I called Ms. Kahn about her CBC:  WBC 7.24 (), Hgb 10.7, Plts 404 MCV 79.8; reductions in MCH and MCHC  Ms. Kahn' AEC is mildly elevated, consistent with her asthma. She is persistently anemic with suggestion of iron deficiency. She affirms that she has had iron deficiency in the past, and she ran out of iron. She is seeing her primary care physician today, and she will discuss her anemia and iron re-initiation.

## 2024-04-18 NOTE — ASSESSMENT
[FreeTextEntry1] : Labs: 2021 WBC 7.87 (), Hgb 10.1, Plts 455  2023 Na 139, K 4.3, Cl 103, HCO3 25, BUN/creat 11/0.67, glucose 87 Tbili 0.2, AST/ALT 20/19, Alk phos 76, TP/Albumin 6.8/3.9  IgE: 256 RAST: +cats, dogs, aspergillus, candida, cockroach  CXR (9/6/23): Impression: No acute abnormality visualized.  PFTs            2021 2023 2024 FEV1/FVC   72%            71%            71% FEV1           1.80, 73%   1.81, 71%   1.89, 79% FVC             2.49, 87%   2.57, 86%   2.65, 94% TLC             3.70, 83%    4.02, 83%   3.56, 80% RV               1.22, 94%    1.39, 123% 1.11, 82% ERV                                                     0.22, 19% DLCO          15.21, 61%  16.29, 78%   16.72, 68% -10/2023: Positive bronchodilator response  -4/2024: Positive bronchodilator response  ACT: 9/6/23: 24  ESS: 9/6/23: 19 1/10/24: 9  Home sleep study 2021: pAHI 35.6, virgie SpO2 81%; time spent <88%: 2.2% 2023: AHI 49, virgie SpO2 70%  CPAP Adherence report 4/2024: Usage >4 hours/night 1%. Leak 12.5 L/min. AHI 5.1. RITU 0.0  Canet score: 15 (low (1.6%) risk of perioperative pulmonary complication).  A/P: 35 yo F h/o childhood asthma and obesity c/b severe NIR returns to clinic.  Ms. Kahn asthma is well-controlled; she is not having exacerbations. With high dose LABA/ICS and Montelukast, she is not needing Albuterol. She has quit smoking tobacco and marijuana. She continues to have a bronchodilator response. I would like to check a FENO at her next visit.   For her NIR, she has the CPAP machine. Her documented adherence is quite low; she suspects that machine is not working properly. I will request the DME company to review. We previously discussed a sleep acclamation visit, though this was declined by her insurance. She was able to speak to our sleep team today to review different mask types.  1. Moderate persistent asthma with exacerbations (1/2023, 9/27/23, 11/2023) 2. Obesity 3. Severe NIR 4. Tobacco use, achieved cessation 5. Marijuana use, no longer smoking  -with CPAP use and improved asthma control, I do not identify any barriers to her weight loss surgery -congratulated on achieving smoking cessation -continue Symbicort to 160 mcg/4.5 mcg/inh, 2 puffs BID; PRN Albuterol -Montelukast 10 mg PO qHS -check CBC today for AEC -continue autoPAP (4-20 cmH2O); will review adherence function of CPAP machine with her DME company -reviewed mask fitting with our sleep team today -encouraged regular, low impact physical activivity -Vaccinations: not discussed today -follow-up with me in 3-6 months with FENO

## 2024-04-18 NOTE — HISTORY OF PRESENT ILLNESS
[Former] : former [Current] : current [TextBox_4] : Ms. Kahn is a 33 yo F h/o childhood asthma and obesity c/b NIR, pre-DM, and OA who was previously evaluated in our clinic for perioperative risk assessment (2021). Her evaluation included PFTs (mild obstruction, reduced DLCO, + bronchodilator response) and a home sleep study showing severe NIR (AHI of 35.6). At the time, Auto-PAP was prescribed but not utilized. Our evaluation has included CXR, PFTs, and sleep study.  10/2023:  We re-ordered a sleep study, increased LABA/ICS dose due to a recent exacerbation and discussed total smoking cessation.  11/8/2023:  Ms. Kahn is feeling sick (rhinorrhea, wheezing, cough w/ clear sputum). After increasing her Symbicort dosing, she felt "really well" for a while. She smokes marijuana 1-2 times/week. RVP done negative, asthma profile + cat, dog, cockroach, aspergillus, candida. Recommended indoor air filter. IgE 239. Sleep study showed AHI of 49 severe NIR so autopap was prescribed again  1/2024:  using cpap x 1 month, consistent use aside form missing it 2x/week. ESS 9 today. overall more energy during daytime with some perceived benefit from CPAP. Only having issues with mask comfort and leaks. Will have our sleep personel to mask study to see if there is a better fit for her. Also reports dry mouth. She is not humidfying her air so instructed her on that. For her asthma, she is doing well on her current regimen of Symbicort BID and PRN albuterol (2x/week). She still has marijuana use 2x/week. not smoking. no steroid or abx use in the last few months.   4/2024: Ms. Kahn is feeling well and has not had respiratory exacerbations since our last visit. She quit smoking tobacco and transitioned all THC use to enteral. She is using her Symbicort as-prescribed, and she is not needing her Albuterol. Her weight loss medication was transitioned to Tirzepatide, which causes more nausea and vomiting. She is wearing her CPAP at least 5 nights/week, though the adherence is not being detected.  PMH: Childhood asthma Anemia NIR, severe Pre-DM OA  FH: Maternal grandparent with breast cancer Mom with HTN  SH: Ms. Kahn lives in [facility]. She works for the [place] [employer].  She smoked 2 cigarettes/day for 8 years (0.8 pack-years); she quit in 2023. She denies vaping. She drinks alcohol 1-2X/month. She smokes marijuana 2-3 times/week.  [TextBox_27] : 2x/week

## 2024-04-18 NOTE — REVIEW OF SYSTEMS
[Fatigue] : fatigue [Negative] : Endocrine [Cough] : no cough [Chest Tightness] : no chest tightness [Sputum] : no sputum [Dyspnea] : no dyspnea [SOB on Exertion] : no sob on exertion

## 2024-04-18 NOTE — PHYSICAL EXAM
[No Acute Distress] : no acute distress [Normal Appearance] : normal appearance [Normal Rate/Rhythm] : normal rate/rhythm [Normal S1, S2] : normal s1, s2 [No Murmurs] : no murmurs [No Resp Distress] : no resp distress [No Abnormalities] : no abnormalities [Benign] : benign [Normal Gait] : normal gait [No Clubbing] : no clubbing [No Edema] : no edema [Normal Color/ Pigmentation] : normal color/ pigmentation [No Focal Deficits] : no focal deficits [Oriented x3] : oriented x3 [Normal Affect] : normal affect [Clear to Auscultation Bilaterally] : clear to auscultation bilaterally [TextBox_11] : NC/AT

## 2024-05-08 RX ORDER — TIRZEPATIDE 2.5 MG/.5ML
2.5 INJECTION, SOLUTION SUBCUTANEOUS
Qty: 1 | Refills: 0 | Status: ACTIVE | COMMUNITY
Start: 2024-03-06 | End: 1900-01-01

## 2024-05-14 ENCOUNTER — APPOINTMENT (OUTPATIENT)
Dept: BARIATRICS | Facility: CLINIC | Age: 34
End: 2024-05-14

## 2024-05-16 ENCOUNTER — APPOINTMENT (OUTPATIENT)
Dept: BARIATRICS | Facility: CLINIC | Age: 34
End: 2024-05-16
Payer: COMMERCIAL

## 2024-05-16 VITALS — WEIGHT: 293 LBS | HEIGHT: 62.99 IN | BODY MASS INDEX: 51.91 KG/M2

## 2024-05-16 DIAGNOSIS — E66.01 MORBID (SEVERE) OBESITY DUE TO EXCESS CALORIES: ICD-10-CM

## 2024-05-16 PROCEDURE — 99203 OFFICE O/P NEW LOW 30 MIN: CPT

## 2024-05-24 VITALS
TEMPERATURE: 97 F | WEIGHT: 293 LBS | DIASTOLIC BLOOD PRESSURE: 87 MMHG | HEIGHT: 62 IN | SYSTOLIC BLOOD PRESSURE: 141 MMHG | HEART RATE: 92 BPM | RESPIRATION RATE: 16 BRPM | OXYGEN SATURATION: 100 %

## 2024-05-24 RX ORDER — ERGOCALCIFEROL 1.25 MG/1
1 CAPSULE ORAL
Refills: 0 | DISCHARGE

## 2024-05-24 NOTE — PATIENT PROFILE ADULT - FALL HARM RISK - CONCLUSION
RN-SRI Note    Subjective:     Health changes since last visit/interval Hx: complaining of numbness on her lips and tongue (right side) and states that she is tired all of the time.   States she went to see her PCP and cardiologist and they felt it was related to her diabetes.     Medications (including changes made today)  Current Outpatient Prescriptions   Medication Sig Dispense Refill   • losartan-hydrochlorothiazide (HYZAAR) 50-12.5 MG per tablet Take 1 Tab by mouth every day.     • escitalopram (LEXAPRO) 20 MG tablet Take 1 Tab by mouth every day. 90 Tab 1   • linagliptin (TRADJENTA) 5 MG Tab tablet Take 1 Tab by mouth every day. 30 Tab 3   • amlodipine (NORVASC) 10 MG Tab Take 1 Tab by mouth every day. 30 Tab 11   • ipratropium-albuterol (DUONEB) 0.5-2.5 (3) MG/3ML nebulizer solution 3 mL by Nebulization route every 6 hours as needed for Shortness of Breath. 30 Vial 0   • albuterol (ACCUNEB) 0.63 MG/3ML nebulizer solution 3 mL by Nebulization route every four hours as needed for Shortness of Breath. 75 mL 0   • Blood Glucose Monitoring Suppl SUPPLIES Misc Test strips for meter. Sig: use four times daily ssx high or low sugar #100 RF x 3 100 Each 6   • Lancets Misc Lancets order: Lancets for meter. Sig: use four times daily ssx high or low sugar. #100 RF x 3 100 Each 5   • Blood Glucose Monitoring Suppl (ONE TOUCH ULTRA 2) W/DEVICE Kit   0   • ONE TOUCH ULTRA TEST strip   5   • Blood Glucose Monitoring Suppl Device Meter: Dispense Device of Insurance Preference. Sig. Use as directed for blood sugar monitoring. #1. NR. 1 Device 0   • fluticasone-salmeterol (ADVAIR HFA) 230-21 MCG/ACT inhaler Inhale 2 Puffs by mouth 2 times a day. 12 g 3     No current facility-administered medications for this visit.        Taking daily ASA: No  Taking above medications as prescribed: Yes  Patient Denies side effects of medication.    Exercise: no regular exercise, sedentary  Diet: meals per day on average: 3-4 times per week.    fast food: average no. meals per week: 3   Patient's body mass index is 30.82 kg/m². Exercise and nutrition counseling were performed at this visit.      Health Maintenance:   Health Maintenance Topics with due status: Overdue       Topic Date Due    DIABETES MONOFILAMENT / LE EXAM [birth year]/1965    PAP SMEAR 08/08/1985    COLONOSCOPY 08/08/2014           DM:   Last A1c:   Lab Results   Component Value Date/Time    HBA1C 6.7 (H) 05/09/2018 07:32 AM      A1c goal: < 7    Glucose monitoring frequency: not testing.       Hypoglycemic episodes: no     Last Retinal Exam: 1/23/18  Daily Foot Exam: yes   Routine Dental Exams: yes    Lab Results   Component Value Date/Time    MALBCRT 7 05/09/2018 07:19 AM    MICROALBUR 4.8 05/09/2018 07:19 AM        ACR Albumin/Creatinine Ratio goal <30       HTN:   Blood pressure goal <140/<80 .   Currently Rx ACE/ARB: No    Dyslipidemia:    Lab Results   Component Value Date/Time    CHOLSTRLTOT 176 05/09/2018 07:19 AM    LDL 85 05/09/2018 07:19 AM    HDL 34.0 (L) 05/09/2018 07:19 AM    TRIGLYCERIDE 287 (H) 05/09/2018 07:19 AM       Lab Results   Component Value Date/Time    SODIUM 139 05/09/2018 07:19 AM    POTASSIUM 4.0 05/09/2018 07:19 AM    CHLORIDE 100 05/09/2018 07:19 AM    CO2 29 05/09/2018 07:19 AM    GLUCOSE 246 (H) 05/09/2018 07:19 AM    BUN 10 05/09/2018 07:19 AM    CREATININE 0.9 05/09/2018 07:19 AM     Lab Results   Component Value Date/Time    ALKPHOSPHAT 93 09/29/2017 08:18 AM    ASTSGOT 26 09/29/2017 08:18 AM    ALTSGPT 45 09/29/2017 08:18 AM    TBILIRUBIN 0.9 09/29/2017 08:18 AM        Currently Rx Statin: No      She  reports that she has been smoking Cigarettes.  She has a 25.00 pack-year smoking history. She has never used smokeless tobacco.    Objective:     Exam:  Monofilament: done  Monofilament testing with a 10 gram force: sensation intact: intact bilaterally  Visual Inspection: Feet without maceration, ulcers, fissures.  Pedal pulses: intact bilaterally      Plan:      Discussed All medications, side effects and compliance (discussed carefully)  Annual eye examinations at Ophthalmology  Diabetic diet discussed in detail, written exchange diet given  Foot care discussed and Podiatry visits  Glycohemoglobin and other lab monitoring  Home glucose monitoring emphasized.         Recommended medication changes: none       Universal Safety Interventions

## 2024-05-24 NOTE — PRE-OP CHECKLIST - SELECT TESTS ORDERED
BMP/CBC/CMP/PT/PTT/INR/Urinalysis/EKG/CXR No DOS HCG -NEGATIVE/BMP/CBC/CMP/PT/PTT/INR/Urinalysis/EKG/CXR

## 2024-05-24 NOTE — PATIENT PROFILE ADULT - FALL HARM RISK - UNIVERSAL INTERVENTIONS
Bed in lowest position, wheels locked, appropriate side rails in place/Call bell, personal items and telephone in reach/Instruct patient to call for assistance before getting out of bed or chair/Non-slip footwear when patient is out of bed/[place] to call system/Physically safe environment - no spills, clutter or unnecessary equipment/Purposeful Proactive Rounding/Room/bathroom lighting operational, light cord in reach

## 2024-05-24 NOTE — PATIENT PROFILE ADULT - NSPROGENOTHERPROVIDER_GEN_A_NUR
returned to baseline based on outpt labs  - Unlikely to have SERGIO, Cr elevation iso poor PO intake.   - Will need f/u regarding source of CKD - outpt  - Renally dosing medications per eGFR  - Checking bladder scan - 0 mL 3am 10/17 none

## 2024-05-27 ENCOUNTER — TRANSCRIPTION ENCOUNTER (OUTPATIENT)
Age: 34
End: 2024-05-27

## 2024-05-28 ENCOUNTER — INPATIENT (INPATIENT)
Facility: HOSPITAL | Age: 34
LOS: 2 days | Discharge: HOME | DRG: 621 | End: 2024-05-31
Attending: SURGERY | Admitting: SURGERY
Payer: COMMERCIAL

## 2024-05-28 ENCOUNTER — RESULT REVIEW (OUTPATIENT)
Age: 34
End: 2024-05-28

## 2024-05-28 ENCOUNTER — APPOINTMENT (OUTPATIENT)
Dept: BARIATRICS | Facility: HOSPITAL | Age: 34
End: 2024-05-28

## 2024-05-28 LAB
BLD GP AB SCN SERPL QL: NEGATIVE — SIGNIFICANT CHANGE UP
GLUCOSE BLDC GLUCOMTR-MCNC: 130 MG/DL — HIGH (ref 70–99)
GLUCOSE BLDC GLUCOMTR-MCNC: 134 MG/DL — HIGH (ref 70–99)
GLUCOSE BLDC GLUCOMTR-MCNC: 79 MG/DL — SIGNIFICANT CHANGE UP (ref 70–99)
HCT VFR BLD CALC: 30.3 % — LOW (ref 34.5–45)
HGB BLD-MCNC: 8.7 G/DL — LOW (ref 11.5–15.5)
MCHC RBC-ENTMCNC: 23 PG — LOW (ref 27–34)
MCHC RBC-ENTMCNC: 28.7 GM/DL — LOW (ref 32–36)
MCV RBC AUTO: 79.9 FL — LOW (ref 80–100)
NRBC # BLD: 0 /100 WBCS — SIGNIFICANT CHANGE UP (ref 0–0)
PLATELET # BLD AUTO: 347 K/UL — SIGNIFICANT CHANGE UP (ref 150–400)
RBC # BLD: 3.79 M/UL — LOW (ref 3.8–5.2)
RBC # FLD: 18.7 % — HIGH (ref 10.3–14.5)
RH IG SCN BLD-IMP: POSITIVE — SIGNIFICANT CHANGE UP
WBC # BLD: 13.86 K/UL — HIGH (ref 3.8–10.5)
WBC # FLD AUTO: 13.86 K/UL — HIGH (ref 3.8–10.5)

## 2024-05-28 PROCEDURE — 43659 UNLISTED LAPS PX STOMACH: CPT | Mod: 22

## 2024-05-28 PROCEDURE — 88307 TISSUE EXAM BY PATHOLOGIST: CPT | Mod: 26

## 2024-05-28 PROCEDURE — 88300 SURGICAL PATH GROSS: CPT | Mod: 26,59

## 2024-05-28 DEVICE — STAPLER ETHICON GST ECHELON 60MM GREEN RELOAD: Type: IMPLANTABLE DEVICE | Status: FUNCTIONAL

## 2024-05-28 DEVICE — STAPLER ETHICON GST ECHELON 60MM BLUE RELOAD: Type: IMPLANTABLE DEVICE | Status: FUNCTIONAL

## 2024-05-28 DEVICE — ARISTA 3GR: Type: IMPLANTABLE DEVICE | Status: FUNCTIONAL

## 2024-05-28 DEVICE — STAPLER ETHICON ECHELON ENDOPATH GRIP SURFACE 60MM WHITE RELOAD: Type: IMPLANTABLE DEVICE | Status: FUNCTIONAL

## 2024-05-28 DEVICE — STAPLER COVIDIEN TRI-STAPLE 60MM BLACK INTELLIGENT RELOAD: Type: IMPLANTABLE DEVICE | Status: FUNCTIONAL

## 2024-05-28 DEVICE — STAPLER COVIDIEN TRI-STAPLE 60MM PURPLE INTELLIGENT RELOAD: Type: IMPLANTABLE DEVICE | Status: FUNCTIONAL

## 2024-05-28 DEVICE — STAPLER ETHICON GST ECHELON 60MM BLACK RELOAD: Type: IMPLANTABLE DEVICE | Status: FUNCTIONAL

## 2024-05-28 DEVICE — SURGIFLO HEMOSTATIC MATRIX KIT: Type: IMPLANTABLE DEVICE | Status: FUNCTIONAL

## 2024-05-28 DEVICE — CLIP APPLIER ETHICON LIGAMAX 5MM: Type: IMPLANTABLE DEVICE | Status: FUNCTIONAL

## 2024-05-28 DEVICE — STAPLER ETHICON ECHELON ENDOPATH 60MM: Type: IMPLANTABLE DEVICE | Status: FUNCTIONAL

## 2024-05-28 RX ORDER — SODIUM CHLORIDE 9 MG/ML
1000 INJECTION, SOLUTION INTRAVENOUS
Refills: 0 | Status: DISCONTINUED | OUTPATIENT
Start: 2024-05-28 | End: 2024-05-31

## 2024-05-28 RX ORDER — BUDESONIDE AND FORMOTEROL FUMARATE DIHYDRATE 160; 4.5 UG/1; UG/1
2 AEROSOL RESPIRATORY (INHALATION)
Refills: 0 | DISCHARGE

## 2024-05-28 RX ORDER — GLUCAGON INJECTION, SOLUTION 0.5 MG/.1ML
1 INJECTION, SOLUTION SUBCUTANEOUS ONCE
Refills: 0 | Status: DISCONTINUED | OUTPATIENT
Start: 2024-05-28 | End: 2024-05-31

## 2024-05-28 RX ORDER — ACETAMINOPHEN 500 MG
650 TABLET ORAL EVERY 6 HOURS
Refills: 0 | Status: DISCONTINUED | OUTPATIENT
Start: 2024-05-28 | End: 2024-05-29

## 2024-05-28 RX ORDER — HYDROMORPHONE HYDROCHLORIDE 2 MG/ML
0.25 INJECTION INTRAMUSCULAR; INTRAVENOUS; SUBCUTANEOUS
Refills: 0 | Status: DISCONTINUED | OUTPATIENT
Start: 2024-05-28 | End: 2024-05-30

## 2024-05-28 RX ORDER — ERGOCALCIFEROL 1.25 MG/1
1 CAPSULE ORAL
Refills: 0 | DISCHARGE

## 2024-05-28 RX ORDER — DEXTROSE 10 % IN WATER 10 %
125 INTRAVENOUS SOLUTION INTRAVENOUS ONCE
Refills: 0 | Status: DISCONTINUED | OUTPATIENT
Start: 2024-05-28 | End: 2024-05-31

## 2024-05-28 RX ORDER — ONDANSETRON 8 MG/1
4 TABLET, FILM COATED ORAL EVERY 6 HOURS
Refills: 0 | Status: DISCONTINUED | OUTPATIENT
Start: 2024-05-28 | End: 2024-05-31

## 2024-05-28 RX ORDER — FERROUS SULFATE 325(65) MG
1 TABLET ORAL
Refills: 0 | DISCHARGE

## 2024-05-28 RX ORDER — THIAMINE MONONITRATE (VIT B1) 100 MG
500 TABLET ORAL DAILY
Refills: 0 | Status: DISCONTINUED | OUTPATIENT
Start: 2024-05-28 | End: 2024-05-31

## 2024-05-28 RX ORDER — ALBUTEROL 90 UG/1
2 AEROSOL, METERED ORAL
Refills: 0 | DISCHARGE

## 2024-05-28 RX ORDER — BUDESONIDE AND FORMOTEROL FUMARATE DIHYDRATE 160; 4.5 UG/1; UG/1
2 AEROSOL RESPIRATORY (INHALATION)
Refills: 0 | Status: DISCONTINUED | OUTPATIENT
Start: 2024-05-28 | End: 2024-05-31

## 2024-05-28 RX ORDER — ALBUTEROL 90 UG/1
2 AEROSOL, METERED ORAL EVERY 6 HOURS
Refills: 0 | Status: DISCONTINUED | OUTPATIENT
Start: 2024-05-28 | End: 2024-05-31

## 2024-05-28 RX ORDER — ACETAMINOPHEN 500 MG
1000 TABLET ORAL ONCE
Refills: 0 | Status: COMPLETED | OUTPATIENT
Start: 2024-05-28 | End: 2024-05-28

## 2024-05-28 RX ORDER — INSULIN LISPRO 100/ML
VIAL (ML) SUBCUTANEOUS
Refills: 0 | Status: DISCONTINUED | OUTPATIENT
Start: 2024-05-28 | End: 2024-05-31

## 2024-05-28 RX ORDER — SODIUM CHLORIDE 9 MG/ML
1000 INJECTION, SOLUTION INTRAVENOUS
Refills: 0 | Status: DISCONTINUED | OUTPATIENT
Start: 2024-05-28 | End: 2024-05-29

## 2024-05-28 RX ORDER — DEXTROSE 50 % IN WATER 50 %
15 SYRINGE (ML) INTRAVENOUS ONCE
Refills: 0 | Status: DISCONTINUED | OUTPATIENT
Start: 2024-05-28 | End: 2024-05-31

## 2024-05-28 RX ORDER — MONTELUKAST 4 MG/1
10 TABLET, CHEWABLE ORAL DAILY
Refills: 0 | Status: DISCONTINUED | OUTPATIENT
Start: 2024-05-29 | End: 2024-05-31

## 2024-05-28 RX ORDER — DEXTROSE 50 % IN WATER 50 %
25 SYRINGE (ML) INTRAVENOUS ONCE
Refills: 0 | Status: DISCONTINUED | OUTPATIENT
Start: 2024-05-28 | End: 2024-05-31

## 2024-05-28 RX ORDER — MONTELUKAST 4 MG/1
1 TABLET, CHEWABLE ORAL
Refills: 0 | DISCHARGE

## 2024-05-28 RX ORDER — ENOXAPARIN SODIUM 100 MG/ML
60 INJECTION SUBCUTANEOUS ONCE
Refills: 0 | Status: COMPLETED | OUTPATIENT
Start: 2024-05-28 | End: 2024-05-28

## 2024-05-28 RX ORDER — DEXTROSE 50 % IN WATER 50 %
12.5 SYRINGE (ML) INTRAVENOUS ONCE
Refills: 0 | Status: DISCONTINUED | OUTPATIENT
Start: 2024-05-28 | End: 2024-05-31

## 2024-05-28 RX ORDER — PANTOPRAZOLE SODIUM 20 MG/1
40 TABLET, DELAYED RELEASE ORAL DAILY
Refills: 0 | Status: DISCONTINUED | OUTPATIENT
Start: 2024-05-28 | End: 2024-05-31

## 2024-05-28 RX ORDER — SCOPALAMINE 1 MG/3D
1 PATCH, EXTENDED RELEASE TRANSDERMAL ONCE
Refills: 0 | Status: COMPLETED | OUTPATIENT
Start: 2024-05-28 | End: 2024-05-28

## 2024-05-28 RX ADMIN — PANTOPRAZOLE SODIUM 40 MILLIGRAM(S): 20 TABLET, DELAYED RELEASE ORAL at 15:25

## 2024-05-28 RX ADMIN — Medication 1000 MILLIGRAM(S): at 17:16

## 2024-05-28 RX ADMIN — SODIUM CHLORIDE 175 MILLILITER(S): 9 INJECTION, SOLUTION INTRAVENOUS at 20:44

## 2024-05-28 RX ADMIN — ONDANSETRON 4 MILLIGRAM(S): 8 TABLET, FILM COATED ORAL at 17:52

## 2024-05-28 RX ADMIN — Medication 400 MILLIGRAM(S): at 22:11

## 2024-05-28 RX ADMIN — Medication 105 MILLIGRAM(S): at 16:34

## 2024-05-28 RX ADMIN — SCOPALAMINE 1 PATCH: 1 PATCH, EXTENDED RELEASE TRANSDERMAL at 08:43

## 2024-05-28 RX ADMIN — Medication 400 MILLIGRAM(S): at 16:46

## 2024-05-28 RX ADMIN — Medication 1000 MILLIGRAM(S): at 22:40

## 2024-05-28 RX ADMIN — SCOPALAMINE 1 PATCH: 1 PATCH, EXTENDED RELEASE TRANSDERMAL at 20:48

## 2024-05-28 RX ADMIN — ENOXAPARIN SODIUM 60 MILLIGRAM(S): 100 INJECTION SUBCUTANEOUS at 09:14

## 2024-05-28 RX ADMIN — SODIUM CHLORIDE 175 MILLILITER(S): 9 INJECTION, SOLUTION INTRAVENOUS at 15:27

## 2024-05-28 RX ADMIN — Medication 1000 MILLIGRAM(S): at 08:44

## 2024-05-28 NOTE — H&P ADULT - NSICDXPASTMEDICALHX_GEN_ALL_CORE_FT
PAST MEDICAL HISTORY:  2019 novel coronavirus disease (COVID-19)     Asthma     DM (diabetes mellitus) PRE    Eczema     H/O iron deficiency     History of vitamin D deficiency     PCOS (polycystic ovarian syndrome)

## 2024-05-28 NOTE — BRIEF OPERATIVE NOTE - OPERATION/FINDINGS
Optiview entry in LUQ. Additional ports placed under direct visualization. Bowel run proximally to 300cm from TI and secured with PDS stitch. Lesser sac then entered at incisura and short gastrics taken with ligasure. Duodenum divided with blue load stapler x1. Small bleed at staple line managed with hemoclips. Significant hepatomegaly observed and liver friable with persistent oozing throughout case. Stomach then divided with green load followed by blue loads over a 42Fr bougie. Duodenotomy and enterotomy performed with hook cautery. Handsewn anastamosis then fashioned with running 2.O quill suture over NGT. Leak test negative. LUQ suctioned with no evidence of active bleeding. Surgiflo placed on liverbed and tasneem powder sprayed on raw surface oozing on staple line. Stomach removed though supraumbilical incision. 15Fr MILDRED drain left under liver along gastric staple line. Fascia closed with O vicryl. Skin closed with monocryl and dermabond.

## 2024-05-28 NOTE — H&P ADULT - HISTORY OF PRESENT ILLNESS
33yo F pt with PMH of MO (BMI 63.2), DMII, PCOS,  asthma, NIR, and CLOVIS who presents for laparoscopic modified duodenal switch. On presentation, pt reports feeling well with no acute complaints.

## 2024-05-28 NOTE — PRE-ANESTHESIA EVALUATION ADULT - NSANTHOSAYNRD_GEN_A_CORE
No. NIR screening performed.  STOP BANG Legend: 0-2 = LOW Risk; 3-4 = INTERMEDIATE Risk; 5-8 = HIGH Risk Yes

## 2024-05-28 NOTE — H&P ADULT - ASSESSMENT
33yo F pt with PMH of MO (BMI 63.2), DMII, PCOS,  asthma, NIR, and CLOVIS who presents for laparoscopic modified duodenal switch.     Plan:  Proceed to OR

## 2024-05-28 NOTE — H&P ADULT - NSHPPHYSICALEXAM_GEN_ALL_CORE
Vital Signs Last 24 Hrs  T(C): 36.2 (28 May 2024 09:08), Max: 36.2 (28 May 2024 09:08)  T(F): --  HR: 92 (28 May 2024 09:08) (92 - 92)  BP: 141/87 (28 May 2024 09:08) (141/87 - 141/87)  BP(mean): --  RR: 16 (28 May 2024 09:08) (16 - 16)  SpO2: 100% (28 May 2024 09:08) (100% - 100%)    General: AAOx3, NAD, laying comfortably in bed  Cardio: RRR  Pulm: Nonlabored breathing  Abdomen: soft, ntnd  Extremities: WWP, peripheral pulses appreciated

## 2024-05-29 LAB
ANION GAP SERPL CALC-SCNC: 9 MMOL/L — SIGNIFICANT CHANGE UP (ref 5–17)
BUN SERPL-MCNC: 7 MG/DL — SIGNIFICANT CHANGE UP (ref 7–23)
CALCIUM SERPL-MCNC: 8.9 MG/DL — SIGNIFICANT CHANGE UP (ref 8.4–10.5)
CHLORIDE SERPL-SCNC: 102 MMOL/L — SIGNIFICANT CHANGE UP (ref 96–108)
CO2 SERPL-SCNC: 25 MMOL/L — SIGNIFICANT CHANGE UP (ref 22–31)
CREAT SERPL-MCNC: 0.67 MG/DL — SIGNIFICANT CHANGE UP (ref 0.5–1.3)
EGFR: 118 ML/MIN/1.73M2 — SIGNIFICANT CHANGE UP
GLUCOSE BLDC GLUCOMTR-MCNC: 115 MG/DL — HIGH (ref 70–99)
GLUCOSE BLDC GLUCOMTR-MCNC: 115 MG/DL — HIGH (ref 70–99)
GLUCOSE BLDC GLUCOMTR-MCNC: 122 MG/DL — HIGH (ref 70–99)
GLUCOSE SERPL-MCNC: 97 MG/DL — SIGNIFICANT CHANGE UP (ref 70–99)
HCT VFR BLD CALC: 27.5 % — LOW (ref 34.5–45)
HGB BLD-MCNC: 8.3 G/DL — LOW (ref 11.5–15.5)
MAGNESIUM SERPL-MCNC: 1.9 MG/DL — SIGNIFICANT CHANGE UP (ref 1.6–2.6)
MCHC RBC-ENTMCNC: 24.2 PG — LOW (ref 27–34)
MCHC RBC-ENTMCNC: 30.2 GM/DL — LOW (ref 32–36)
MCV RBC AUTO: 80.2 FL — SIGNIFICANT CHANGE UP (ref 80–100)
NRBC # BLD: 0 /100 WBCS — SIGNIFICANT CHANGE UP (ref 0–0)
PHOSPHATE SERPL-MCNC: 3.3 MG/DL — SIGNIFICANT CHANGE UP (ref 2.5–4.5)
PLATELET # BLD AUTO: 352 K/UL — SIGNIFICANT CHANGE UP (ref 150–400)
POTASSIUM SERPL-MCNC: 4.2 MMOL/L — SIGNIFICANT CHANGE UP (ref 3.5–5.3)
POTASSIUM SERPL-SCNC: 4.2 MMOL/L — SIGNIFICANT CHANGE UP (ref 3.5–5.3)
RBC # BLD: 3.43 M/UL — LOW (ref 3.8–5.2)
RBC # FLD: 18.7 % — HIGH (ref 10.3–14.5)
SODIUM SERPL-SCNC: 136 MMOL/L — SIGNIFICANT CHANGE UP (ref 135–145)
WBC # BLD: 11.11 K/UL — HIGH (ref 3.8–10.5)
WBC # FLD AUTO: 11.11 K/UL — HIGH (ref 3.8–10.5)

## 2024-05-29 RX ORDER — ACETAMINOPHEN 500 MG
1000 TABLET ORAL ONCE
Refills: 0 | Status: COMPLETED | OUTPATIENT
Start: 2024-05-29 | End: 2024-05-29

## 2024-05-29 RX ORDER — SODIUM CHLORIDE 9 MG/ML
1000 INJECTION, SOLUTION INTRAVENOUS
Refills: 0 | Status: DISCONTINUED | OUTPATIENT
Start: 2024-05-29 | End: 2024-05-31

## 2024-05-29 RX ORDER — ACETAMINOPHEN 500 MG
1000 TABLET ORAL EVERY 6 HOURS
Refills: 0 | Status: DISCONTINUED | OUTPATIENT
Start: 2024-05-29 | End: 2024-05-31

## 2024-05-29 RX ORDER — DEXAMETHASONE 0.5 MG/5ML
6 ELIXIR ORAL ONCE
Refills: 0 | Status: COMPLETED | OUTPATIENT
Start: 2024-05-29 | End: 2024-05-29

## 2024-05-29 RX ADMIN — PANTOPRAZOLE SODIUM 40 MILLIGRAM(S): 20 TABLET, DELAYED RELEASE ORAL at 12:37

## 2024-05-29 RX ADMIN — Medication 105 MILLIGRAM(S): at 12:38

## 2024-05-29 RX ADMIN — SCOPALAMINE 1 PATCH: 1 PATCH, EXTENDED RELEASE TRANSDERMAL at 06:12

## 2024-05-29 RX ADMIN — SCOPALAMINE 1 PATCH: 1 PATCH, EXTENDED RELEASE TRANSDERMAL at 20:00

## 2024-05-29 RX ADMIN — ONDANSETRON 4 MILLIGRAM(S): 8 TABLET, FILM COATED ORAL at 01:25

## 2024-05-29 RX ADMIN — SODIUM CHLORIDE 100 MILLILITER(S): 9 INJECTION, SOLUTION INTRAVENOUS at 23:33

## 2024-05-29 RX ADMIN — ONDANSETRON 4 MILLIGRAM(S): 8 TABLET, FILM COATED ORAL at 12:46

## 2024-05-29 RX ADMIN — Medication 400 MILLIGRAM(S): at 04:28

## 2024-05-29 RX ADMIN — Medication 6 MILLIGRAM(S): at 12:37

## 2024-05-29 RX ADMIN — MONTELUKAST 10 MILLIGRAM(S): 4 TABLET, CHEWABLE ORAL at 12:39

## 2024-05-29 RX ADMIN — Medication 1000 MILLIGRAM(S): at 21:35

## 2024-05-29 RX ADMIN — Medication 1000 MILLIGRAM(S): at 05:00

## 2024-05-29 RX ADMIN — BUDESONIDE AND FORMOTEROL FUMARATE DIHYDRATE 2 PUFF(S): 160; 4.5 AEROSOL RESPIRATORY (INHALATION) at 18:52

## 2024-05-29 RX ADMIN — Medication 1000 MILLIGRAM(S): at 22:35

## 2024-05-29 RX ADMIN — ONDANSETRON 4 MILLIGRAM(S): 8 TABLET, FILM COATED ORAL at 06:38

## 2024-05-29 RX ADMIN — Medication 400 MILLIGRAM(S): at 15:37

## 2024-05-29 NOTE — DIETITIAN INITIAL EVALUATION ADULT - PERTINENT LABORATORY DATA
05-29    136  |  102  |  7   ----------------------------<  97  4.2   |  25  |  0.67    Ca    8.9      29 May 2024 05:30  Phos  3.3     05-29  Mg     1.9     05-29    POCT Blood Glucose.: 115 mg/dL (05-29-24 @ 06:42)

## 2024-05-29 NOTE — DIETITIAN INITIAL EVALUATION ADULT - NS FNS DIET ORDER
Diet, Clear Liquid:   Consistent Carbohydrate {No Snacks} (CSTCHO)  Bariatric Clear Liquid (BARICLLIQ) (05-28-24 @ 11:04)

## 2024-05-29 NOTE — DIETITIAN INITIAL EVALUATION ADULT - PERSON TAUGHT/METHOD
RD Discussed volumes of various cup sizes on tray table and encouraged aiming for 4 oz/hr as tolerated. Pt is prepared with protein shakes, with plan to get vitamins after discharge. RD provided indepth education on diet advancement and specific nutrient needs status post modified duodenal switch surgery. Pt/pt's mother appears receptive, verbalized understanding. Written nutrition education handouts provided./verbal instruction/written material/patient instructed/mother instructed

## 2024-05-29 NOTE — DIETITIAN NUTRITION RISK NOTIFICATION - TREATMENT: THE FOLLOWING DIET HAS BEEN RECOMMENDED
Diet, Clear Liquid:   Consistent Carbohydrate {No Snacks} (CSTCHO)  Bariatric Clear Liquid (BARICLLIQ) (05-28-24 @ 11:04) [Active]

## 2024-05-29 NOTE — DIETITIAN INITIAL EVALUATION ADULT - PERTINENT MEDS FT
MEDICATIONS  (STANDING):  acetaminophen   Oral Liquid .. 650 milliGRAM(s) Oral every 6 hours  budesonide 160 MICROgram(s)/formoterol 4.5 MICROgram(s) Inhaler 2 Puff(s) Inhalation two times a day  dexAMETHasone  Injectable 6 milliGRAM(s) IV Push once  dextrose 10% Bolus 125 milliLiter(s) IV Bolus once  dextrose 5%. 1000 milliLiter(s) (50 mL/Hr) IV Continuous <Continuous>  dextrose 5%. 1000 milliLiter(s) (100 mL/Hr) IV Continuous <Continuous>  dextrose 50% Injectable 25 Gram(s) IV Push once  dextrose 50% Injectable 12.5 Gram(s) IV Push once  glucagon  Injectable 1 milliGRAM(s) IntraMuscular once  insulin lispro (ADMELOG) corrective regimen sliding scale   SubCutaneous Before meals and at bedtime  lactated ringers. 1000 milliLiter(s) (100 mL/Hr) IV Continuous <Continuous>  montelukast 10 milliGRAM(s) Oral daily  pantoprazole  Injectable 40 milliGRAM(s) IV Push daily  thiamine IVPB 500 milliGRAM(s) IV Intermittent daily    MEDICATIONS  (PRN):  albuterol    90 MICROgram(s) HFA Inhaler 2 Puff(s) Inhalation every 6 hours PRN Shortness of Breath and/or Wheezing  dextrose Oral Gel 15 Gram(s) Oral once PRN Blood Glucose LESS THAN 70 milliGRAM(s)/deciliter  HYDROmorphone  Injectable 0.25 milliGRAM(s) IV Push every 15 minutes PRN Severe Pain (7 - 10)  ondansetron Injectable 4 milliGRAM(s) IV Push every 6 hours PRN Nausea

## 2024-05-29 NOTE — DIETITIAN INITIAL EVALUATION ADULT - OTHER INFO
This is a 34 year old pt with past medical history of morbid obesity (BMI 63.2), DM type 2, PCOS, asthma, NIR, and CLOVIS who presents for laparoscopic modified duodenal switch. Pt is now status post modified duodenal switch 5/28/24.     Pt seen on 8 lachman at bedside. On assessment, pt resting in bed. RD spoke to pt's mother for most of the interview. Currently on Bariatric Clear Liquids (BARICLLIQ) diet, tolerating minimal PO related to nausea and emesis. Pt had sips of water out of the clear, 30cc cups. Pain and nausea somewhat controlled, pt's RN and medical team are aware. Discussed volumes of various cup sizes on tray table and encouraged aiming for 4 oz/hr as tolerated. Prepared with protein shakes, with plan to get vitamins after discharge. RD provided in-depth education on diet advancement and specific nutrient needs status-post modified duodenal switch surgery. No known food allergies. No dietary restrictions at home. Skin: surgical incisions. GI: WDL per flowsheet. Labs reviewed: elevated POCT glucose (115-134), pt is receiving insulin; will monitor trends. Pt's wt on admission was 345.5 pounds, pt's ideal body weight is 110 pounds, pt is 314% of ideal body weight; ideal body weight to be utilized for nutrient calculations. RD observed pt with no overt signs of muscle or fat wasting. Based on ASPEN guidelines, pt does not meet criteria for malnutrition at this time. RD to continue to follow up.

## 2024-05-29 NOTE — DIETITIAN INITIAL EVALUATION ADULT - OTHER CALCULATIONS
Above energy needs calculated for wt maintenance (20-25kcal/kg ideal body weight).  Weeks 1-2 estimated needs: 500-600kcal/day (10-12kcal/kg ideal body weight), 75-105g protein/day (1.5-2.1g/kg ideal body weight), >/=64oz clear fluids.

## 2024-05-30 ENCOUNTER — TRANSCRIPTION ENCOUNTER (OUTPATIENT)
Age: 34
End: 2024-05-30

## 2024-05-30 LAB
ANION GAP SERPL CALC-SCNC: 9 MMOL/L — SIGNIFICANT CHANGE UP (ref 5–17)
BUN SERPL-MCNC: 8 MG/DL — SIGNIFICANT CHANGE UP (ref 7–23)
CALCIUM SERPL-MCNC: 9.1 MG/DL — SIGNIFICANT CHANGE UP (ref 8.4–10.5)
CHLORIDE SERPL-SCNC: 103 MMOL/L — SIGNIFICANT CHANGE UP (ref 96–108)
CO2 SERPL-SCNC: 25 MMOL/L — SIGNIFICANT CHANGE UP (ref 22–31)
CREAT SERPL-MCNC: 0.63 MG/DL — SIGNIFICANT CHANGE UP (ref 0.5–1.3)
EGFR: 119 ML/MIN/1.73M2 — SIGNIFICANT CHANGE UP
GLUCOSE BLDC GLUCOMTR-MCNC: 112 MG/DL — HIGH (ref 70–99)
GLUCOSE BLDC GLUCOMTR-MCNC: 75 MG/DL — SIGNIFICANT CHANGE UP (ref 70–99)
GLUCOSE BLDC GLUCOMTR-MCNC: 84 MG/DL — SIGNIFICANT CHANGE UP (ref 70–99)
GLUCOSE BLDC GLUCOMTR-MCNC: 86 MG/DL — SIGNIFICANT CHANGE UP (ref 70–99)
GLUCOSE SERPL-MCNC: 103 MG/DL — HIGH (ref 70–99)
HCT VFR BLD CALC: 26 % — LOW (ref 34.5–45)
HGB BLD-MCNC: 7.9 G/DL — LOW (ref 11.5–15.5)
MAGNESIUM SERPL-MCNC: 2 MG/DL — SIGNIFICANT CHANGE UP (ref 1.6–2.6)
MCHC RBC-ENTMCNC: 24.2 PG — LOW (ref 27–34)
MCHC RBC-ENTMCNC: 30.4 GM/DL — LOW (ref 32–36)
MCV RBC AUTO: 79.8 FL — LOW (ref 80–100)
NRBC # BLD: 0 /100 WBCS — SIGNIFICANT CHANGE UP (ref 0–0)
PHOSPHATE SERPL-MCNC: 2.8 MG/DL — SIGNIFICANT CHANGE UP (ref 2.5–4.5)
PLATELET # BLD AUTO: 318 K/UL — SIGNIFICANT CHANGE UP (ref 150–400)
POTASSIUM SERPL-MCNC: 4.4 MMOL/L — SIGNIFICANT CHANGE UP (ref 3.5–5.3)
POTASSIUM SERPL-SCNC: 4.4 MMOL/L — SIGNIFICANT CHANGE UP (ref 3.5–5.3)
RBC # BLD: 3.26 M/UL — LOW (ref 3.8–5.2)
RBC # FLD: 18.8 % — HIGH (ref 10.3–14.5)
SODIUM SERPL-SCNC: 137 MMOL/L — SIGNIFICANT CHANGE UP (ref 135–145)
WBC # BLD: 9.98 K/UL — SIGNIFICANT CHANGE UP (ref 3.8–10.5)
WBC # FLD AUTO: 9.98 K/UL — SIGNIFICANT CHANGE UP (ref 3.8–10.5)

## 2024-05-30 RX ORDER — ONDANSETRON 8 MG/1
4 TABLET, FILM COATED ORAL ONCE
Refills: 0 | Status: COMPLETED | OUTPATIENT
Start: 2024-05-30 | End: 2024-05-30

## 2024-05-30 RX ORDER — ACETAMINOPHEN 500 MG
1000 TABLET ORAL ONCE
Refills: 0 | Status: COMPLETED | OUTPATIENT
Start: 2024-05-30 | End: 2024-05-30

## 2024-05-30 RX ORDER — OMEPRAZOLE 10 MG/1
1 CAPSULE, DELAYED RELEASE ORAL
Qty: 30 | Refills: 0
Start: 2024-05-30 | End: 2024-06-28

## 2024-05-30 RX ORDER — ACETAMINOPHEN 500 MG
20 TABLET ORAL
Qty: 560 | Refills: 0
Start: 2024-05-30 | End: 2024-06-05

## 2024-05-30 RX ORDER — METFORMIN HYDROCHLORIDE 850 MG/1
1 TABLET ORAL
Refills: 0 | DISCHARGE

## 2024-05-30 RX ORDER — APIXABAN 2.5 MG/1
1 TABLET, FILM COATED ORAL
Qty: 42 | Refills: 0
Start: 2024-05-30 | End: 2024-06-19

## 2024-05-30 RX ORDER — ENOXAPARIN SODIUM 100 MG/ML
60 INJECTION SUBCUTANEOUS ONCE
Refills: 0 | Status: COMPLETED | OUTPATIENT
Start: 2024-05-30 | End: 2024-05-30

## 2024-05-30 RX ADMIN — Medication 1000 MILLIGRAM(S): at 21:56

## 2024-05-30 RX ADMIN — BUDESONIDE AND FORMOTEROL FUMARATE DIHYDRATE 2 PUFF(S): 160; 4.5 AEROSOL RESPIRATORY (INHALATION) at 06:02

## 2024-05-30 RX ADMIN — ENOXAPARIN SODIUM 60 MILLIGRAM(S): 100 INJECTION SUBCUTANEOUS at 07:50

## 2024-05-30 RX ADMIN — SCOPALAMINE 1 PATCH: 1 PATCH, EXTENDED RELEASE TRANSDERMAL at 18:25

## 2024-05-30 RX ADMIN — Medication 1000 MILLIGRAM(S): at 22:56

## 2024-05-30 RX ADMIN — ONDANSETRON 4 MILLIGRAM(S): 8 TABLET, FILM COATED ORAL at 22:44

## 2024-05-30 RX ADMIN — MONTELUKAST 10 MILLIGRAM(S): 4 TABLET, CHEWABLE ORAL at 15:41

## 2024-05-30 RX ADMIN — Medication 1000 MILLIGRAM(S): at 06:02

## 2024-05-30 RX ADMIN — Medication 400 MILLIGRAM(S): at 12:24

## 2024-05-30 RX ADMIN — Medication 1000 MILLIGRAM(S): at 07:02

## 2024-05-30 RX ADMIN — BUDESONIDE AND FORMOTEROL FUMARATE DIHYDRATE 2 PUFF(S): 160; 4.5 AEROSOL RESPIRATORY (INHALATION) at 18:02

## 2024-05-30 RX ADMIN — PANTOPRAZOLE SODIUM 40 MILLIGRAM(S): 20 TABLET, DELAYED RELEASE ORAL at 12:24

## 2024-05-30 RX ADMIN — SCOPALAMINE 1 PATCH: 1 PATCH, EXTENDED RELEASE TRANSDERMAL at 06:14

## 2024-05-30 RX ADMIN — Medication 105 MILLIGRAM(S): at 12:24

## 2024-05-30 RX ADMIN — Medication 1000 MILLIGRAM(S): at 13:24

## 2024-05-30 RX ADMIN — ONDANSETRON 4 MILLIGRAM(S): 8 TABLET, FILM COATED ORAL at 21:06

## 2024-05-30 NOTE — DISCHARGE NOTE PROVIDER - HOSPITAL COURSE
35yo F pt with PMH of MO (BMI 63.2), DMII, PCOS, asthma, NIR, and CLOVIS presented for laparoscopic modified duodenal switch on 5/28. There was some oozing from gastric staple line during procedure but hemostasis was eventually achieved and a MILDRED drain was left in place. Pt hemoglobin remained stable throughout her hospital stay. The remainder of her postoperative course was unremarkable with toleration of diet, passing trial of void, and pain control. MILDRED drain removed on ___. On day of discharge patient was stable to be discharged home.   33yo F pt with PMH of MO (BMI 63.2), DMII, PCOS, asthma, NIR, and CLOVIS presented for laparoscopic modified duodenal switch on 5/28. There was some oozing from gastric staple line during procedure but hemostasis was eventually achieved and a MILDRED drain was left in place. Pt hemoglobin remained stable throughout her hospital stay. The remainder of her postoperative course was unremarkable with toleration of diet, passing trial of void, and pain control. MILDRED drain removed on day of discharge. On day of discharge patient was stable to be discharged home.

## 2024-05-30 NOTE — DISCHARGE NOTE PROVIDER - NSDCFUSCHEDAPPT_GEN_ALL_CORE_FT
Malik Campo  [facility]  [facility]ATRIC [facility] [address]  Scheduled Appointment: 06/19/2024    Arnulfo Rivas  [facility]  PULMMED [address]  Scheduled Appointment: 08/14/2024

## 2024-05-30 NOTE — DISCHARGE NOTE PROVIDER - NSDCMRMEDTOKEN_GEN_ALL_CORE_FT
Albuterol (Eqv-ProAir HFA) 90 mcg/inh inhalation aerosol: 2 puff(s) inhaled prn  ergocalciferol 1.25 mg (50,000 intl units) oral capsule: 1 cap(s) orally once a week  ferrous sulfate (as elemental iron) 45 mg oral tablet, extended release: 1 tab(s) orally once a day  metFORMIN 500 mg oral tablet: 1 tab(s) orally 2 times a day  montelukast 10 mg oral tablet: 1 tab(s) orally once a day (at bedtime)  Symbicort 160 mcg-4.5 mcg/inh inhalation aerosol: 2 puff(s) inhaled 2 times a day   acetaminophen 160 mg/5 mL oral liquid: 20 milliliter(s) orally every 6 hours as needed for  moderate pain  Albuterol (Eqv-ProAir HFA) 90 mcg/inh inhalation aerosol: 2 puff(s) inhaled prn  Eliquis 2.5 mg oral tablet: 1 tab(s) orally 2 times a day Please take 2 times a day for 21 days starting Friday 5/31  ergocalciferol 1.25 mg (50,000 intl units) oral capsule: 1 cap(s) orally once a week  ferrous sulfate (as elemental iron) 45 mg oral tablet, extended release: 1 tab(s) orally once a day  montelukast 10 mg oral tablet: 1 tab(s) orally once a day (at bedtime)  omeprazole 40 mg oral delayed release capsule: 1 cap(s) orally once a day  Symbicort 160 mcg-4.5 mcg/inh inhalation aerosol: 2 puff(s) inhaled 2 times a day   acetaminophen 160 mg/5 mL oral liquid: 20 milliliter(s) orally every 6 hours as needed for  moderate pain  Albuterol (Eqv-ProAir HFA) 90 mcg/inh inhalation aerosol: 2 puff(s) inhaled prn  Eliquis 2.5 mg oral tablet: 1 tab(s) orally 2 times a day Please take 2 times a day for 21 days starting   ergocalciferol 1.25 mg (50,000 intl units) oral capsule: 1 cap(s) orally once a week  ferrous sulfate (as elemental iron) 45 mg oral tablet, extended release: 1 tab(s) orally once a day  montelukast 10 mg oral tablet: 1 tab(s) orally once a day (at bedtime)  omeprazole 40 mg oral delayed release capsule: 1 cap(s) orally once a day  ondansetron 4 mg oral tablet, disintegratin tab(s) orally every 6 hours as needed for  nausea  Symbicort 160 mcg-4.5 mcg/inh inhalation aerosol: 2 puff(s) inhaled 2 times a day

## 2024-05-30 NOTE — DISCHARGE NOTE PROVIDER - CARE PROVIDER_API CALL
Malik Campo [facility]  Surgery  [address]  [place] [address]  Phone: [phone]  Fax: [phone]  Follow Up Time:

## 2024-05-30 NOTE — DISCHARGE NOTE PROVIDER - NSDCFUADDINST_GEN_ALL_CORE_FT
Please follow up with Dr. Campo at your scheduled appointment on 6/19. Please call [phone] for any questions. You may shower; soap and water over incision sites. Do not scrub. Pat dry when done. No tub bathing or swimming until cleared. Keep incision sites out of the sun as scars will darken. No heavy lifting (>10lbs) or strenuous exercise. Diet: Bariatric Clear Fluids. 60 grams protein daily.  64 fluid ounces water daily. Drink small sips throughout the day. Continue diet as outlined by paperwork received as a pre-operative patient. You should be urinating at least 3-4x per day. Call the office if you experience increasing abdominal pain, nausea, vomiting, or temperature >100.4F.      New Medications:  1. Please take omeprazole 40 mg once a day.   2. Please take Tylenol solution 650 mg every 6 hours for mild to moderate pain control.   3. Please start eliquis 2.5 mg twice a day for 21 days on Friday 5/31.    Warning Signs:  Please call your doctor if you experience the following:  *You experience new chest pain, pressure, squeezing or tightness.  *New or worsening cough, shortness of breath, or wheeze.  *If you are vomiting and cannot keep down fluids or your medications.  *You are getting dehydrated due to continued vomiting, diarrhea, or other reasons. Signs of dehydration include dry mouth, rapid heartbeat, or feeling dizzy or faint when standing.  *You see blood or dark/black material when you vomit or have a bowel movement.  *You experience burning when you urinate, have blood in your urine, or experience a discharge.  *Your pain is not improving within 8-12 hours or is not gone within 24 hours. Call or return immediately if your pain is getting worse, changes location, or moves to your chest or back.  *You have shaking chills, or fever greater than 101.5 degrees Fahrenheit or 38 degrees Celsius.  *Any change in your symptoms, or any new symptoms that concern you.   Please follow up with Dr. Campo at your scheduled appointment on 6/19. Please call [phone] for any questions. You may shower; soap and water over incision sites. Do not scrub. Pat dry when done. No tub bathing or swimming until cleared. Keep incision sites out of the sun as scars will darken. No heavy lifting (>10lbs) or strenuous exercise. Diet: Bariatric Clear Fluids. 60 grams protein daily.  64 fluid ounces water daily. Drink small sips throughout the day. Continue diet as outlined by paperwork received as a pre-operative patient. You should be urinating at least 3-4x per day. Call the office if you experience increasing abdominal pain, nausea, vomiting, or temperature >100.4F.      New Medications:  1. Please take omeprazole 40 mg once a day.   2. Please take Tylenol solution 650 mg every 6 hours for mild to moderate pain control.   3. Please start eliquis 2.5 mg twice a day for 21 days on Friday 5/31.  4. Please do not resume your metformin until you meet with your primary care physician and Dr. Campo  5. You may resume all other home medications    Warning Signs:  Please call your doctor if you experience the following:  *You experience new chest pain, pressure, squeezing or tightness.  *New or worsening cough, shortness of breath, or wheeze.  *If you are vomiting and cannot keep down fluids or your medications.  *You are getting dehydrated due to continued vomiting, diarrhea, or other reasons. Signs of dehydration include dry mouth, rapid heartbeat, or feeling dizzy or faint when standing.  *You see blood or dark/black material when you vomit or have a bowel movement.  *You experience burning when you urinate, have blood in your urine, or experience a discharge.  *Your pain is not improving within 8-12 hours or is not gone within 24 hours. Call or return immediately if your pain is getting worse, changes location, or moves to your chest or back.  *You have shaking chills, or fever greater than 101.5 degrees Fahrenheit or 38 degrees Celsius.  *Any change in your symptoms, or any new symptoms that concern you.   Please follow up with Dr. Campo at your scheduled appointment on 6/19. Please call [phone] for any questions. You may shower; soap and water over incision sites. Do not scrub. Pat dry when done. No tub bathing or swimming until cleared. Keep incision sites out of the sun as scars will darken. No heavy lifting (>10lbs) or strenuous exercise. Diet: Bariatric Clear Fluids. 60 grams protein daily.  64 fluid ounces water daily. Drink small sips throughout the day. Continue diet as outlined by paperwork received as a pre-operative patient. You should be urinating at least 3-4x per day. Call the office if you experience increasing abdominal pain, nausea, vomiting, or temperature >100.4F.      New Medications:  1. Please take omeprazole 40 mg once a day.   2. Please take Tylenol solution 650 mg every 6 hours for mild to moderate pain control.   3. Please start eliquis 2.5 mg twice a day for 21 days on Friday 5/31.  4. Please do not resume your metformin until you meet with your primary care physician and Dr. Campo  5. You may resume all other home medications  6. You have been prescribed zofran. Please take every 6 hours as needed for nausea     Warning Signs:  Please call your doctor if you experience the following:  *You experience new chest pain, pressure, squeezing or tightness.  *New or worsening cough, shortness of breath, or wheeze.  *If you are vomiting and cannot keep down fluids or your medications.  *You are getting dehydrated due to continued vomiting, diarrhea, or other reasons. Signs of dehydration include dry mouth, rapid heartbeat, or feeling dizzy or faint when standing.  *You see blood or dark/black material when you vomit or have a bowel movement.  *You experience burning when you urinate, have blood in your urine, or experience a discharge.  *Your pain is not improving within 8-12 hours or is not gone within 24 hours. Call or return immediately if your pain is getting worse, changes location, or moves to your chest or back.  *You have shaking chills, or fever greater than 101.5 degrees Fahrenheit or 38 degrees Celsius.  *Any change in your symptoms, or any new symptoms that concern you.

## 2024-05-31 ENCOUNTER — TRANSCRIPTION ENCOUNTER (OUTPATIENT)
Age: 34
End: 2024-05-31

## 2024-05-31 ENCOUNTER — NON-APPOINTMENT (OUTPATIENT)
Age: 34
End: 2024-05-31

## 2024-05-31 VITALS
SYSTOLIC BLOOD PRESSURE: 109 MMHG | OXYGEN SATURATION: 98 % | RESPIRATION RATE: 17 BRPM | TEMPERATURE: 98 F | DIASTOLIC BLOOD PRESSURE: 73 MMHG | HEART RATE: 62 BPM

## 2024-05-31 LAB
ANION GAP SERPL CALC-SCNC: 9 MMOL/L — SIGNIFICANT CHANGE UP (ref 5–17)
BUN SERPL-MCNC: 11 MG/DL — SIGNIFICANT CHANGE UP (ref 7–23)
CALCIUM SERPL-MCNC: 8.6 MG/DL — SIGNIFICANT CHANGE UP (ref 8.4–10.5)
CHLORIDE SERPL-SCNC: 103 MMOL/L — SIGNIFICANT CHANGE UP (ref 96–108)
CO2 SERPL-SCNC: 27 MMOL/L — SIGNIFICANT CHANGE UP (ref 22–31)
CREAT SERPL-MCNC: 0.75 MG/DL — SIGNIFICANT CHANGE UP (ref 0.5–1.3)
EGFR: 107 ML/MIN/1.73M2 — SIGNIFICANT CHANGE UP
GLUCOSE BLDC GLUCOMTR-MCNC: 69 MG/DL — LOW (ref 70–99)
GLUCOSE BLDC GLUCOMTR-MCNC: 72 MG/DL — SIGNIFICANT CHANGE UP (ref 70–99)
GLUCOSE BLDC GLUCOMTR-MCNC: 73 MG/DL — SIGNIFICANT CHANGE UP (ref 70–99)
GLUCOSE BLDC GLUCOMTR-MCNC: 73 MG/DL — SIGNIFICANT CHANGE UP (ref 70–99)
GLUCOSE BLDC GLUCOMTR-MCNC: 82 MG/DL — SIGNIFICANT CHANGE UP (ref 70–99)
GLUCOSE BLDC GLUCOMTR-MCNC: 94 MG/DL — SIGNIFICANT CHANGE UP (ref 70–99)
GLUCOSE SERPL-MCNC: 76 MG/DL — SIGNIFICANT CHANGE UP (ref 70–99)
HCT VFR BLD CALC: 26.2 % — LOW (ref 34.5–45)
HGB BLD-MCNC: 7.5 G/DL — LOW (ref 11.5–15.5)
MAGNESIUM SERPL-MCNC: 2 MG/DL — SIGNIFICANT CHANGE UP (ref 1.6–2.6)
MCHC RBC-ENTMCNC: 24.1 PG — LOW (ref 27–34)
MCHC RBC-ENTMCNC: 28.6 GM/DL — LOW (ref 32–36)
MCV RBC AUTO: 84.2 FL — SIGNIFICANT CHANGE UP (ref 80–100)
NRBC # BLD: 2 /100 WBCS — HIGH (ref 0–0)
PHOSPHATE SERPL-MCNC: 3.3 MG/DL — SIGNIFICANT CHANGE UP (ref 2.5–4.5)
PLATELET # BLD AUTO: 308 K/UL — SIGNIFICANT CHANGE UP (ref 150–400)
POTASSIUM SERPL-MCNC: 3.8 MMOL/L — SIGNIFICANT CHANGE UP (ref 3.5–5.3)
POTASSIUM SERPL-SCNC: 3.8 MMOL/L — SIGNIFICANT CHANGE UP (ref 3.5–5.3)
RBC # BLD: 3.11 M/UL — LOW (ref 3.8–5.2)
RBC # FLD: 18.6 % — HIGH (ref 10.3–14.5)
SODIUM SERPL-SCNC: 139 MMOL/L — SIGNIFICANT CHANGE UP (ref 135–145)
WBC # BLD: 7.28 K/UL — SIGNIFICANT CHANGE UP (ref 3.8–10.5)
WBC # FLD AUTO: 7.28 K/UL — SIGNIFICANT CHANGE UP (ref 3.8–10.5)

## 2024-05-31 PROCEDURE — 85027 COMPLETE CBC AUTOMATED: CPT

## 2024-05-31 PROCEDURE — C1889: CPT

## 2024-05-31 PROCEDURE — 86850 RBC ANTIBODY SCREEN: CPT

## 2024-05-31 PROCEDURE — 94660 CPAP INITIATION&MGMT: CPT

## 2024-05-31 PROCEDURE — 94640 AIRWAY INHALATION TREATMENT: CPT

## 2024-05-31 PROCEDURE — 36415 COLL VENOUS BLD VENIPUNCTURE: CPT

## 2024-05-31 PROCEDURE — 88307 TISSUE EXAM BY PATHOLOGIST: CPT

## 2024-05-31 PROCEDURE — 86900 BLOOD TYPING SEROLOGIC ABO: CPT

## 2024-05-31 PROCEDURE — 80048 BASIC METABOLIC PNL TOTAL CA: CPT

## 2024-05-31 PROCEDURE — 88300 SURGICAL PATH GROSS: CPT

## 2024-05-31 PROCEDURE — 82962 GLUCOSE BLOOD TEST: CPT

## 2024-05-31 PROCEDURE — 86901 BLOOD TYPING SEROLOGIC RH(D): CPT

## 2024-05-31 PROCEDURE — 84100 ASSAY OF PHOSPHORUS: CPT

## 2024-05-31 PROCEDURE — 83735 ASSAY OF MAGNESIUM: CPT

## 2024-05-31 RX ORDER — APIXABAN 2.5 MG/1
2.5 TABLET, FILM COATED ORAL ONCE
Refills: 0 | Status: COMPLETED | OUTPATIENT
Start: 2024-05-31 | End: 2024-05-31

## 2024-05-31 RX ORDER — METOCLOPRAMIDE HCL 10 MG
10 TABLET ORAL ONCE
Refills: 0 | Status: COMPLETED | OUTPATIENT
Start: 2024-05-31 | End: 2024-05-31

## 2024-05-31 RX ORDER — ONDANSETRON 8 MG/1
1 TABLET, FILM COATED ORAL
Qty: 2 | Refills: 0
Start: 2024-05-31

## 2024-05-31 RX ORDER — DEXTROSE 50 % IN WATER 50 %
12.5 SYRINGE (ML) INTRAVENOUS ONCE
Refills: 0 | Status: COMPLETED | OUTPATIENT
Start: 2024-05-31 | End: 2024-05-31

## 2024-05-31 RX ORDER — ACETAMINOPHEN 500 MG
1000 TABLET ORAL ONCE
Refills: 0 | Status: COMPLETED | OUTPATIENT
Start: 2024-05-31 | End: 2024-05-31

## 2024-05-31 RX ADMIN — SODIUM CHLORIDE 100 MILLILITER(S): 9 INJECTION, SOLUTION INTRAVENOUS at 08:37

## 2024-05-31 RX ADMIN — PANTOPRAZOLE SODIUM 40 MILLIGRAM(S): 20 TABLET, DELAYED RELEASE ORAL at 12:50

## 2024-05-31 RX ADMIN — Medication 12.5 GRAM(S): at 14:21

## 2024-05-31 RX ADMIN — Medication 105 MILLIGRAM(S): at 12:50

## 2024-05-31 RX ADMIN — BUDESONIDE AND FORMOTEROL FUMARATE DIHYDRATE 2 PUFF(S): 160; 4.5 AEROSOL RESPIRATORY (INHALATION) at 06:53

## 2024-05-31 RX ADMIN — Medication 1000 MILLIGRAM(S): at 08:12

## 2024-05-31 RX ADMIN — Medication 10 MILLIGRAM(S): at 11:26

## 2024-05-31 RX ADMIN — SCOPALAMINE 1 PATCH: 1 PATCH, EXTENDED RELEASE TRANSDERMAL at 10:02

## 2024-05-31 RX ADMIN — SCOPALAMINE 1 PATCH: 1 PATCH, EXTENDED RELEASE TRANSDERMAL at 06:55

## 2024-05-31 RX ADMIN — ONDANSETRON 4 MILLIGRAM(S): 8 TABLET, FILM COATED ORAL at 15:39

## 2024-05-31 RX ADMIN — APIXABAN 2.5 MILLIGRAM(S): 2.5 TABLET, FILM COATED ORAL at 11:26

## 2024-05-31 RX ADMIN — ONDANSETRON 4 MILLIGRAM(S): 8 TABLET, FILM COATED ORAL at 07:11

## 2024-05-31 RX ADMIN — BUDESONIDE AND FORMOTEROL FUMARATE DIHYDRATE 2 PUFF(S): 160; 4.5 AEROSOL RESPIRATORY (INHALATION) at 18:04

## 2024-05-31 RX ADMIN — Medication 400 MILLIGRAM(S): at 15:39

## 2024-05-31 RX ADMIN — Medication 1000 MILLIGRAM(S): at 07:12

## 2024-05-31 RX ADMIN — MONTELUKAST 10 MILLIGRAM(S): 4 TABLET, CHEWABLE ORAL at 18:03

## 2024-05-31 NOTE — PROVIDER CONTACT NOTE (HYPOGLYCEMIA EVENT) - NS PROVIDER CONTACT NOTE-TREATMENT-HYPO
pt on bariatric clear liquid diet, pt able to drink liquids slowly. Pt drank ~120ml apple juice/Dextrose 50% 12.5 Grams IV Push/4 oz Fruit Juice...

## 2024-05-31 NOTE — PROVIDER CONTACT NOTE (HYPOGLYCEMIA EVENT) - NS PROVIDER CONTACT BACKGROUND-HYPO
Age: 34y    Gender: Female    POCT Blood Glucose:  94 mg/dL (05-31-24 @ 14:41)  73 mg/dL (05-31-24 @ 14:06)  72 mg/dL (05-31-24 @ 13:38)  69 mg/dL (05-31-24 @ 13:13)  73 mg/dL (05-31-24 @ 07:00)  84 mg/dL (05-30-24 @ 22:09)  75 mg/dL (05-30-24 @ 17:18)      eMAR:  dextrose 50% Injectable   12.5 Gram(s) IV Push (05-31-24 @ 14:21)

## 2024-05-31 NOTE — PROGRESS NOTE ADULT - NUTRITIONAL ASSESSMENT
This patient has been assessed with a concern for Malnutrition and has been determined to have a diagnosis/diagnoses of Morbid obesity (BMI > 40).    This patient is being managed with:   Diet Clear Liquid-  Consistent Carbohydrate {No Snacks} (CSTCHO)  Bariatric Clear Liquid (BARICLLIQ)  Entered: May 28 2024  2:56PM  
This patient has been assessed with a concern for Malnutrition and has been determined to have a diagnosis/diagnoses of Morbid obesity (BMI > 40).    This patient is being managed with:   Diet Clear Liquid-  Consistent Carbohydrate {No Snacks} (CSTCHO)  Bariatric Clear Liquid (BARICLLIQ)  Entered: May 28 2024  2:56PM

## 2024-05-31 NOTE — PROGRESS NOTE ADULT - SUBJECTIVE AND OBJECTIVE BOX
POST-OPERATIVE NOTE    Procedure: Laparoscopic modified duodenal switch    Diagnosis/Indication: Morbid obesity     Surgeon: Dr. Campo     S: Pt has no complaints. Denies CP, SOB, ROGERS, calf tenderness. Pain controlled with medication.    O:  T(C): 36.2 (05-28-24 @ 14:46), Max: 36.2 (05-28-24 @ 14:46)  T(F): 97.2 (05-28-24 @ 14:46), Max: 97.2 (05-28-24 @ 14:46)  HR: 64 (05-28-24 @ 16:16) (63 - 75)  BP: 112/62 (05-28-24 @ 16:16) (102/63 - 148/71)  RR: 16 (05-28-24 @ 16:16) (12 - 20)  SpO2: 100% (05-28-24 @ 16:16) (100% - 100%)  Wt(kg): --            Gen: NAD, resting comfortably in bed  C/V: NSR  Pulm: Nonlabored breathing, no respiratory distress  Abd: soft, non distended, TTP around incision site , incision clean dry and intact   Extrem: WWP, no calf edema, SCDs in place      
INTERVAL HPI/OVERNIGHT EVENTS: 8 pm hgb 8.7(10.5). not tolerating liquid tylenol, IV tylenol ordered, pTOV  5/28: s/p mDS, POC WNL    STATUS POST:  Laparoscopic modified duodenal switch     POST OPERATIVE DAY #: 1    SUBJECTIVE: Patient examined bedside with Dr. Campo. Patient complains of incisional pain, nausea and spit up. Patient reports that she is not tolerating bariatric clear liquid . Patient reports she is ambulating and using incentive spirometer. Patient denies SOB and chest pain. Patient making adequate urine output.           Vital Signs Last 24 Hrs  T(C): 37.7 (29 May 2024 04:24), Max: 37.7 (29 May 2024 04:24)  T(F): 99.9 (29 May 2024 04:24), Max: 99.9 (29 May 2024 04:24)  HR: 62 (29 May 2024 03:31) (62 - 77)  BP: 143/84 (29 May 2024 03:31) (102/63 - 148/71)  BP(mean): 107 (29 May 2024 03:31) (70 - 107)  RR: 18 (29 May 2024 03:31) (12 - 20)  SpO2: 97% (29 May 2024 03:31) (97% - 100%)    Parameters below as of 29 May 2024 03:31  Patient On (Oxygen Delivery Method): room air      I&O's Detail    28 May 2024 07:01  -  29 May 2024 07:00  --------------------------------------------------------  IN:    IV PiggyBack: 150 mL    Lactated Ringers: 2625 mL  Total IN: 2775 mL    OUT:    Bulb (mL): 85 mL    Voided (mL): 1100 mL  Total OUT: 1185 mL    Total NET: 1590 mL          General: NAD, resting comfortably in bed  C/V: NSR  Pulm: Nonlabored breathing, no respiratory distress  Abd: Soft, non-distended, TTP around incision site, incision clean dry and intact.   Extrem: WWP, no edema, SCDs in place      LABS:                        8.7    13.86 )-----------( 347      ( 28 May 2024 20:40 )             30.3           
STATUS POST:  Laparoscopic duodenal switch    POST OPERATIVE DAY #: 2    SUBJECTIVE: Patient was seen and examined by chief resident. Patient resting in bed, states she feels slightly better than yesterday. Pain is improving and controlled with medication. Only able to tolerate small sips of BCLD. OOB/A without difficulty.       Vital Signs Last 24 Hrs  T(C): 36.4 (30 May 2024 04:41), Max: 37.2 (29 May 2024 10:15)  T(F): 97.6 (30 May 2024 04:41), Max: 98.9 (29 May 2024 10:15)  HR: 60 (30 May 2024 06:04) (55 - 80)  BP: 125/83 (30 May 2024 04:41) (125/83 - 134/74)  BP(mean): 97 (29 May 2024 09:05) (97 - 97)  RR: 16 (30 May 2024 06:04) (15 - 18)  SpO2: 100% (30 May 2024 06:04) (95% - 100%)    Parameters below as of 30 May 2024 06:04  Patient On (Oxygen Delivery Method): room air        I&O's Summary    28 May 2024 07:01  -  29 May 2024 07:00  --------------------------------------------------------  IN: 2775 mL / OUT: 1185 mL / NET: 1590 mL    29 May 2024 07:01  -  30 May 2024 06:50  --------------------------------------------------------  IN: 1440 mL / OUT: 1500 mL / NET: -60 mL        Physical Exam:  General Appearance: Appears well, NAD  Pulmonary: Nonlabored breathing, no respiratory distress  Cardiovascular: NSR  Abdomen: Soft, ND, appropriate incisional tenderness, incisions CDI, MILDRED SS  Extremities: WWP, SCD's in place     LABS:                        8.3    11.11 )-----------( 352      ( 29 May 2024 05:30 )             27.5     05-29    136  |  102  |  7   ----------------------------<  97  4.2   |  25  |  0.67    Ca    8.9      29 May 2024 05:30  Phos  3.3     05-29  Mg     1.9     05-29        Urinalysis Basic - ( 29 May 2024 05:30 )    Color: x / Appearance: x / SG: x / pH: x  Gluc: 97 mg/dL / Ketone: x  / Bili: x / Urobili: x   Blood: x / Protein: x / Nitrite: x   Leuk Esterase: x / RBC: x / WBC x   Sq Epi: x / Non Sq Epi: x / Bacteria: x      
STATUS POST:  Laparoscopic duodenal switch    POST OPERATIVE DAY #: 3    SUBJECTIVE: Patient was seen and examined by chief resident. Patient resting comfortably in bed. Pt states she is slightly nauseous this morning but still able to tolerate clears & jello. Ambulating without difficulty.       Vital Signs Last 24 Hrs  T(C): 36.5 (31 May 2024 12:31), Max: 37.1 (30 May 2024 20:50)  T(F): 97.7 (31 May 2024 12:31), Max: 98.7 (30 May 2024 20:50)  HR: 66 (31 May 2024 12:31) (54 - 86)  BP: 105/75 (31 May 2024 12:31) (101/71 - 127/79)  BP(mean): --  RR: 17 (31 May 2024 12:31) (14 - 18)  SpO2: 97% (31 May 2024 12:31) (94% - 100%)    Parameters below as of 31 May 2024 12:31  Patient On (Oxygen Delivery Method): room air        I&O's Summary    30 May 2024 07:01  -  31 May 2024 07:00  --------------------------------------------------------  IN: 2360 mL / OUT: 1300 mL / NET: 1060 mL    31 May 2024 07:01  -  31 May 2024 12:58  --------------------------------------------------------  IN: 590 mL / OUT: 110 mL / NET: 480 mL        Physical Exam:  General Appearance: Appears well, NAD  Pulmonary: Nonlabored breathing, no respiratory distress  Cardiovascular: NSR  Abdomen: Soft, ND, appropriate incisional tenderness, incisions CDI, MILDRED SS  Extremities: WWP, SCD's in place     LABS:                        7.5    7.28  )-----------( 308      ( 31 May 2024 05:30 )             26.2     05-31    139  |  103  |  11  ----------------------------<  76  3.8   |  27  |  0.75    Ca    8.6      31 May 2024 05:30  Phos  3.3     05-31  Mg     2.0     05-31        Urinalysis Basic - ( 31 May 2024 05:30 )    Color: x / Appearance: x / SG: x / pH: x  Gluc: 76 mg/dL / Ketone: x  / Bili: x / Urobili: x   Blood: x / Protein: x / Nitrite: x   Leuk Esterase: x / RBC: x / WBC x   Sq Epi: x / Non Sq Epi: x / Bacteria: x

## 2024-05-31 NOTE — DISCHARGE NOTE NURSING/CASE MANAGEMENT/SOCIAL WORK - PATIENT PORTAL LINK FT
You can access the FollowMyHealth Patient Portal offered by [facility] by registering at the following website: [email or web address]. By joining [facility]’s FollowMyHealth portal, you will also be able to view your health information using other applications (apps) compatible with our system.

## 2024-05-31 NOTE — PROGRESS NOTE ADULT - ASSESSMENT
33yo F pt with PMH of MO (BMI 63.2), DMII, PCOS,  asthma, NIR, and CLOVIS who presents for laparoscopic modified duodenal switch. Now s/p mDS on 5/28.      Telemetry  BCLD/IVF  Pain/nausea control PRN  Thiamine 500mg qd  Protonix 40mg IV  mISS  MILDRED drain  SCDs/OOB/IS  Nutrition Consult  AM labs         Patient seen with and case discussed in detail with Dr. Campo   
33yo F pt with PMH of MO (BMI 63.2), DMII, PCOS,  asthma, NIR, and CLOVIS who presents for laparoscopic modified duodenal switch. Now s/p mDS on 5/28.      BCLD/IVF  Pain/nausea control PRN  Thiamine 500mg qd  Protonix 40mg IV  mISS  MILDRED drain  SCDs/OOB/IS  Nutrition Consult  AM labs
35yo F pt with PMH of MO (BMI 63.2), DMII, PCOS,  asthma, NIR, and CLOVIS who presents for laparoscopic modified duodenal switch. Now s/p mDS on 5/28.    Plan:  Telemetry  BCLD/IVF  Pain/nausea control PRN  Thiamine 500mg qd  Protonix 40mg IV  mISS  MILDRDE drain  SCDs/OOB/IS  Nutrition Consult  AM labs
35yo F pt with PMH of MO (BMI 63.2), DMII, PCOS,  asthma, NIR, and CLOVIS who presents for laparoscopic modified duodenal switch. Now s/p mDS on 5/28.      BCLD/IVF  Pain/nausea control PRN  Thiamine 500mg qd  Protonix 40mg IV  mISS  MILDRED drain  SCDs/OOB/IS  Nutrition Consult  AM labs

## 2024-06-03 ENCOUNTER — NON-APPOINTMENT (OUTPATIENT)
Age: 34
End: 2024-06-03

## 2024-06-04 ENCOUNTER — NON-APPOINTMENT (OUTPATIENT)
Age: 34
End: 2024-06-04

## 2024-06-05 LAB — SURGICAL PATHOLOGY STUDY: SIGNIFICANT CHANGE UP

## 2024-06-07 DIAGNOSIS — Z79.84 LONG TERM (CURRENT) USE OF ORAL HYPOGLYCEMIC DRUGS: ICD-10-CM

## 2024-06-07 DIAGNOSIS — R16.0 HEPATOMEGALY, NOT ELSEWHERE CLASSIFIED: ICD-10-CM

## 2024-06-07 DIAGNOSIS — G47.33 OBSTRUCTIVE SLEEP APNEA (ADULT) (PEDIATRIC): ICD-10-CM

## 2024-06-07 DIAGNOSIS — E11.9 TYPE 2 DIABETES MELLITUS WITHOUT COMPLICATIONS: ICD-10-CM

## 2024-06-07 DIAGNOSIS — D50.9 IRON DEFICIENCY ANEMIA, UNSPECIFIED: ICD-10-CM

## 2024-06-07 DIAGNOSIS — E28.2 POLYCYSTIC OVARIAN SYNDROME: ICD-10-CM

## 2024-06-07 DIAGNOSIS — L30.9 DERMATITIS, UNSPECIFIED: ICD-10-CM

## 2024-06-07 DIAGNOSIS — E66.01 MORBID (SEVERE) OBESITY DUE TO EXCESS CALORIES: ICD-10-CM

## 2024-06-07 DIAGNOSIS — J45.909 UNSPECIFIED ASTHMA, UNCOMPLICATED: ICD-10-CM

## 2024-06-07 DIAGNOSIS — E55.9 VITAMIN D DEFICIENCY, UNSPECIFIED: ICD-10-CM

## 2024-06-13 ENCOUNTER — NON-APPOINTMENT (OUTPATIENT)
Age: 34
End: 2024-06-13

## 2024-06-19 ENCOUNTER — APPOINTMENT (OUTPATIENT)
Dept: BARIATRICS | Facility: CLINIC | Age: 34
End: 2024-06-19
Payer: COMMERCIAL

## 2024-06-19 VITALS
SYSTOLIC BLOOD PRESSURE: 115 MMHG | DIASTOLIC BLOOD PRESSURE: 79 MMHG | TEMPERATURE: 97.2 F | WEIGHT: 293 LBS | OXYGEN SATURATION: 100 % | HEART RATE: 89 BPM | HEIGHT: 62.99 IN | BODY MASS INDEX: 51.91 KG/M2

## 2024-06-19 PROBLEM — Z86.39 PERSONAL HISTORY OF OTHER ENDOCRINE, NUTRITIONAL AND METABOLIC DISEASE: Chronic | Status: ACTIVE | Noted: 2024-05-24

## 2024-06-19 PROBLEM — E11.9 TYPE 2 DIABETES MELLITUS WITHOUT COMPLICATIONS: Chronic | Status: ACTIVE | Noted: 2024-05-24

## 2024-06-19 PROBLEM — J45.909 UNSPECIFIED ASTHMA, UNCOMPLICATED: Chronic | Status: ACTIVE | Noted: 2024-05-24

## 2024-06-19 PROBLEM — L30.9 DERMATITIS, UNSPECIFIED: Chronic | Status: ACTIVE | Noted: 2024-05-24

## 2024-06-19 PROBLEM — E28.2 POLYCYSTIC OVARIAN SYNDROME: Chronic | Status: ACTIVE | Noted: 2024-05-24

## 2024-06-19 PROBLEM — U07.1 COVID-19: Chronic | Status: ACTIVE | Noted: 2024-05-24

## 2024-06-19 PROCEDURE — 99024 POSTOP FOLLOW-UP VISIT: CPT

## 2024-06-19 NOTE — PHYSICAL EXAM
[Obese] : obese [Normal] : normoactive bowel sounds, soft and nontender, no hepatosplenomegaly or masses appreciated. Incisions healing appropriately without erythema or drainage [de-identified] : incisions are healing well with no signs of infection

## 2024-06-19 NOTE — END OF VISIT
[Time Spent: ___ minutes] : I have spent [unfilled] minutes of time on the encounter. [FreeTextEntry3] :  All medical record entries made by the Scribe were at my, MORIS Lock , direction and personally dictated by me on 06/19/2024 . I have reviewed the chart and agree that the record accurately reflects my personal performance of the history, physical exam, assessment and plan. I have also personally directed, reviewed, and agreed with the chart.

## 2024-06-19 NOTE — HISTORY OF PRESENT ILLNESS
[de-identified] : Randa Kahn is 35 y/o F 2 weeks s/p MDS who presents today for post-op care. BMI today is 55 and has lost 31 lbs since surgery. Patient is doing well, denies n/v/d/ SOB, acid reflux, po intolerance, chest pain, abd pain, dizziness, fever and calf pain. On physical examination, incisions are healing well with no signs of infection. Additionally, patient states drinking 32-48 oz of water every day and is compliant with vitamins. Diet consists of protein shakes, tuna fish, soups, yogurt, apple sauce, scrambled eggs, and peanut butter. Patient will begin exercising as tolerated and has been advised to avoid lifting more than 20 lbs for at least 4 weeks post-op. Patient will meet dietician today to advance diet as per protocol and follow up in 1 month.

## 2024-06-19 NOTE — ADDENDUM
[FreeTextEntry1] :  Documented by Sudhir Lancaster acting as a scribe for MORIS Rosado  on 06/19/2024  .

## 2024-06-19 NOTE — REASON FOR VISIT
[Morbid Obesity (BMI>40)] : morbid obesity (bmi>40) [S/P Bariatric Surgery] : s/p bariatric surgery [Post Operative Visit] : a post operative visit for

## 2024-07-04 NOTE — PRE-ANESTHESIA EVALUATION ADULT - NSPROPOSEDPROCEDFT_GEN_ALL_CORE
[facility]    Cardiology Consultation     Primary Cardiologist: Dr. Tinajero ([facility])    Date of Admission: 7/2/2024  Service Date: 07/04/2024    Summary:   Ms. Elisa Mcnulty is a very pleasant 68 year old female with a past medical history of type 2 diabetes mellitus, GERD, hypertension, hyperlipidemia, hypothyroidism, severe oxygen dependent COPD, ongoing smoking, and coronary artery disease with history of RCA stent in 2013 who was admitted on 7/2/2024 after transfer from [facility] due to chest pain radiating to her neck and left arm with rise in troponins from 22 initially up to 470 consistent with a NSTEMI.    Assessment:  1.  NSTEMI  2.  Known history of CAD s/p RCA stent in 2013. Coronary angiogram in March 2019 showed nonobstructive CAD with patent previously placed RCA stent. 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  3.  Coronary angiogram 7/3/24: Severe single-vessel coronary artery disease with a 90% stenosis of the distal RCA with extension of the lesion into the RPDA - successful IVUS guided PCI of the distal RCA and RPDA with placement of a Synergy XD 3.0 x 24 mm GEMINI, which was post-dilated proximally with a 3.5 mm NC balloon.  There was an excellent angiographic result and HALLIE-3 flow.  4.  Hypertension  5.  Hyperlipidemia  6.  Severe oxygen dependent COPD (on 2 L/min at baseline)  7.  Type 2 diabetes mellitus  8.  Ongoing smoking around 1/2 ppd    Interval History  She underwent coronary angiogram yesterday which showed the following:    Severe single-vessel coronary artery disease with a 90% stenosis of the distal RCA with extension of the lesion into the RPDA.  Successful IVUS guided PCI of the distal RCA and RPDA with placement of a Synergy XD 3.0 x 24 mm GEMINI, which was post-dilated proximally with a 3.5 mm NC balloon.  There was an excellent angiographic result and HALLIE-3 flow.    She continues to have remittent chest pain since the  procedure.  This occurs at rest, is worse with moving about in bed and is better when she lies flat.  The pain is not pleuritic.  The pain is not exertional and has not been helped with Maalox.  She has received oxycodone this morning.  No other complaints.      Plan:   1.  During her coronary angiography yesterday she had successful PCI to the distal RCA with a good angiographic result.  Otherwise she had nonobstructive coronary disease.  In addition to this, the pain that she is describing is atypical and does not seem cardiac in etiology.  Would recommend continued medical management for this, agree with discontinuing nitroglycerin drip, and consider GI evaluation.  2.  Continue aspirin and ticagrelor for at least 12 months then aspirin thereafter lifelong  3.  Continue lovastatin 40 mg once daily  4.  Continue lisinopril 10 mg once daily and metoprolol succinate 12.5 mg daily  5.  Recommend smoking cessation  6.  Cardiac rehabilitation  7.  Outpatient cardiology follow-up in 1 to 2 weeks  8.  We will sign off at this point    35 total minutes was spent today including chart review, precharting, history and exam, post visit documentation, patient education, and reviewing studies as outlined above.     Clinton Hanley [facility]    Physical Exam   Vitals: /59   Pulse 70   Temp 98.1  F (36.7  C) (Oral)   Resp 18   Wt 64.1 kg (141 lb 5 oz)   SpO2 94%   BMI 23.52 kg/m    Wt Readings from Last 4 Encounters:   07/04/24 64.1 kg (141 lb 5 oz)   07/02/24 65.8 kg (145 lb)   05/28/24 63.5 kg (140 lb)   04/10/24 63.9 kg (140 lb 12.8 oz)     I/O last 3 completed shifts:  In: 669.95 [P.O.:100; I.V.:569.95]  Out: 1075 [Urine:1075]    Physical Exam:   General: Appears her stated age, well nourished, and in no acute distress.  Eyes: No scleral icterus.  HEENT: Neck supple. No JVD.  Cardiovascular: Regular rate and rhythm, normal S1 and S2. No murmur, rub, or gallop.  Extremities: Moves all extremities well and symmetrically.  There is no LE edema.  Respiratory: Breathing non-labored. Lungs clear to auscultation with no crackles or wheezes bilaterally.  Skin: No pallor or cyanosis.  Gastrointestinal: Non-tender and non-distended without guarding.  Psych: Appropriate affect. Mentation normal.  Neurological: No gross motor neurological focal deficits.    Data   Recent Labs   Lab 07/03/24 0818 07/02/24  1534   WBC 10.8 11.7*   HGB 14.1 16.0*   HCT 43.3 47.6*   MCV 96 94    165     Recent Labs   Lab 07/04/24 0643 07/04/24 0224 07/03/24 2121 07/03/24 0959 07/03/24 0818 07/03/24 0036 07/02/24  1534   NA  --   --   --   --  140  --  139   POTASSIUM  --   --   --   --  4.2  --  4.4   CHLORIDE  --   --   --   --  105  --  99   CO2  --   --   --   --  26 -- 24   ANIONGAP  --   --   --   --  9  --  16*   * 144* 185*   < > 122*   < > 233*   BUN  --   --   --   --  19.9  --  24.2*   CR  --   --   --   --  0.72  --  0.85   GFRESTIMATED  --   --   --   --  >90  --  74   CINDY  --   --   --   --  9.7  --  11.2*    < > = values in this interval not displayed.     Recent Labs   Lab 07/04/24 0643 07/04/24 0224 07/03/24 2121 07/03/24 0959 07/03/24 0818 07/03/24 0036 07/02/24  1534   NA  --   --   --   --  140  --  139   POTASSIUM  --   --   --   --  4.2  --  4.4   CHLORIDE  --   --   --   --  105  --  99   CO2  --   --   --   --  26 -- 24   ANIONGAP  --   --   --   --  9  --  16*   * 144* 185*   < > 122*   < > 233*   BUN  --   --   --   --  19.9  --  24.2*   CR  --   --   --   --  0.72  --  0.85   GFRESTIMATED  --   --   --   --  >90  --  74   CINDY  --   --   --   --  9.7  --  11.2*   PROTTOTAL  --   --   --   --   --   --  7.7   ALBUMIN  --   --   --   --   --   --  5.0   BILITOTAL  --   --   --   --   --   --  0.8   ALKPHOS  --   --   --   --   --   --  128   AST  --   --   --   --   --   --  36   ALT  --   --   --   --   --   --  42    < > = values in this interval not displayed.     Recent Labs   Lab 07/02/24  5603    NTBNPI 65     Echo 7/3/24:  Left ventricular systolic function is normal.  The visual ejection fraction is 60-65%.  No regional wall motion abnormalities noted.  The right ventricle is normal in size and function.  No significant valve disease.  No pericardial effusion.     Compared to the prior study dated 3/24/2019, LVEF has improved from 50-55% to  60-65% and wall motion abnormalities have normalized.    Imaging:  Recent Results (from the past 48 hour(s))   CT Aortic Survey w Contrast    Narrative    EXAM: CT AORTIC SURVEY W CONTRAST  LOCATION: [facility]  DATE: 7/2/2024    INDICATION: Severe chest pain with radiation to back. Nausea, vomiting and upper abdominal pain.  COMPARISON: CT chest 6/10/2024, CT abdomen pelvis 5/13/2024 and 3/16/2023  TECHNIQUE: CT angiogram chest abdomen pelvis during arterial phase of injection of IV contrast. 2D and 3D MIP reconstructions were performed by the CT technologist. Dose reduction techniques were used.   CONTRAST: 72mL Isovue 370    FINDINGS:   CT ANGIOGRAM CHEST, ABDOMEN, AND PELVIS: No thoracoabdominal aortic aneurysm/dissection. 3 vessels arise from the aortic arch without significant stenosis. No evidence of pulmonary embolus.    The celiac, SMA, ELIDA and single bilateral renal arteries are widely patent. Mild aortoiliac atherosclerosis without stenosis.    LUNGS AND PLEURA: Moderate upper lung predominant paraseptal emphysema. Previous 7 mm lower lobe endobronchial nodule/mucous plugging has resolved. No new nor enlarging nodules. New bibasilar subsegmental atelectasis. No pleural effusion. Central airways   are patent.    MEDIASTINUM/AXILLAE: No mass/adenopathy nor pericardial effusion. The heart is normal in size.    CORONARY ARTERY CALCIFICATION: Moderate.    HEPATOBILIARY: Stable 8 mm hyperenhancing cavernous hemangioma posterior right hepatic dome dating back to 2023. Normal gallbladder and biliary system.    PANCREAS:  Normal.    SPLEEN: Stable borderline splenomegaly measuring 13.2 cm.    ADRENAL GLANDS: Normal.    KIDNEYS/BLADDER: Normal.    BOWEL: Large diverticulum in the third portion of duodenum unchanged. Colonic diverticulosis. Diffuse wall thickening of the ascending and descending colon felt to be secondary to underdistention. Large fecalith within the cecum with prominent   fluid-filled appendiceal stump unchanged. No obstruction or inflammatory change.    LYMPH NODES: No lymphadenopathy.    PELVIC ORGANS: Low pelvis obscured by beam hardening artifact from bilateral hip arthroplasty. No adnexal mass nor abnormal fluid collection.    MUSCULOSKELETAL: Bilateral hip arthroplasty. Leftward lumbar scoliosis with multilevel level degenerative change. No suspicious osseous lesions.      Impression    IMPRESSION:  1.  CTA negative for thoracoabdominal aortic aneurysm/dissection. No acute pulmonary embolus.  2.  Large fecalith within the cecum at the base of dilated chronically dilated, fluid-filled appendiceal stump. No acute inflammatory change.  3.  Colonic diverticulosis and large duodenal diverticulum redemonstrated. No associated inflammatory change.  4.  Moderate upper lung predominant paraseptal emphysema.  5.  Resolution of previous 7 mm right lower lobe endobronchial nodule likely representing mucous plugging.       Clinically Significant Risk Factors           # Hypercalcemia: Highest Ca = 11.2 mg/dL in last 2 days, will monitor as appropriate     # Coagulation Defect: INR = 1.18 (Ref range: 0.85 - 1.15) and/or PTT = N/A, will monitor for bleeding    # Hypertension: Noted on problem list               # DMII: A1C = 8.5 % (Ref range: 0.0 - 5.6 %) within past 6 months, PRESENT ON ADMISSION          Please kindly note that this document was completed in part using Dragon voice recognition software. Although reviewed after completion, some word substitutions and typographical errors may occur. Please contact me if  clarification is needed.      Duodenal Switch

## 2024-07-10 ENCOUNTER — APPOINTMENT (OUTPATIENT)
Dept: BARIATRICS | Facility: CLINIC | Age: 34
End: 2024-07-10
Payer: COMMERCIAL

## 2024-07-10 VITALS
TEMPERATURE: 97.2 F | OXYGEN SATURATION: 97 % | WEIGHT: 293 LBS | SYSTOLIC BLOOD PRESSURE: 111 MMHG | HEIGHT: 62.99 IN | DIASTOLIC BLOOD PRESSURE: 78 MMHG | HEART RATE: 86 BPM | BODY MASS INDEX: 51.91 KG/M2

## 2024-07-10 DIAGNOSIS — Z98.84 BARIATRIC SURGERY STATUS: ICD-10-CM

## 2024-07-10 PROCEDURE — 99024 POSTOP FOLLOW-UP VISIT: CPT

## 2024-07-10 RX ORDER — ACETAMINOPHEN 325 MG/1
325 TABLET ORAL EVERY 6 HOURS
Qty: 120 | Refills: 0 | Status: ACTIVE | COMMUNITY
Start: 2024-07-10 | End: 1900-01-01

## 2024-07-24 ENCOUNTER — APPOINTMENT (OUTPATIENT)
Dept: BARIATRICS | Facility: CLINIC | Age: 34
End: 2024-07-24
Payer: COMMERCIAL

## 2024-07-24 VITALS
HEART RATE: 86 BPM | HEIGHT: 62.99 IN | DIASTOLIC BLOOD PRESSURE: 75 MMHG | TEMPERATURE: 97 F | WEIGHT: 293 LBS | BODY MASS INDEX: 51.91 KG/M2 | OXYGEN SATURATION: 100 % | SYSTOLIC BLOOD PRESSURE: 109 MMHG

## 2024-07-24 DIAGNOSIS — E66.01 MORBID (SEVERE) OBESITY DUE TO EXCESS CALORIES: ICD-10-CM

## 2024-07-24 DIAGNOSIS — Z98.84 BARIATRIC SURGERY STATUS: ICD-10-CM

## 2024-07-24 PROCEDURE — 99024 POSTOP FOLLOW-UP VISIT: CPT

## 2024-07-24 RX ORDER — HYOSCYAMINE SULFATE 0.12 MG/1
0.12 TABLET SUBLINGUAL 3 TIMES DAILY
Qty: 21 | Refills: 0 | Status: ACTIVE | COMMUNITY
Start: 2024-07-18 | End: 1900-01-01

## 2024-07-25 NOTE — PHYSICAL EXAM
[Obese] : obese [Obese, well nourished, in no acute distress] : obese, well nourished, in no acute distress [Normal] : normoactive bowel sounds, soft and nontender, no hepatosplenomegaly or masses appreciated. Incisions healing appropriately without erythema or drainage [de-identified] : removed small stitch protruding through left abdominal incision. Clean, dry and intact, no signs of infection

## 2024-07-25 NOTE — PHYSICAL EXAM
[Obese] : obese [Obese, well nourished, in no acute distress] : obese, well nourished, in no acute distress [Normal] : normoactive bowel sounds, soft and nontender, no hepatosplenomegaly or masses appreciated. Incisions healing appropriately without erythema or drainage [de-identified] : removed small stitch protruding through left abdominal incision. Clean, dry and intact, no signs of infection

## 2024-07-25 NOTE — HISTORY OF PRESENT ILLNESS
[de-identified] : Randa Kahn is a 33 y/o F 2 months s/p MDS on 05/28/2024 who presents today for post op care. Doing well and denies pain, n/v/acid reflux, po intolerance. Tolerating regular diet well and continues working on increasing protein intake. Compliant with vitamins and denies difficulty with bowel movements. Reports to being more physically active at work. Concerned about a stitch in one of her abdominal incisions.

## 2024-07-25 NOTE — ADDENDUM
[FreeTextEntry1] :  Documented by Sudhir Lancaster acting as a scribe for Dr. Malik Campo on 07/24/2024  .

## 2024-07-25 NOTE — HISTORY OF PRESENT ILLNESS
[de-identified] : Randa Kahn is a 35 y/o F 2 months s/p MDS on 05/28/2024 who presents today for post op care. Doing well and denies pain, n/v/acid reflux, po intolerance. Tolerating regular diet well and continues working on increasing protein intake. Compliant with vitamins and denies difficulty with bowel movements. Reports to being more physically active at work. Concerned about a stitch in one of her abdominal incisions.

## 2024-07-25 NOTE — ASSESSMENT
[FreeTextEntry1] : Randa Kahn is 35 y/o F doing well 2 months s/p MDS. Removed stitch from left abdomen with no issues or signs of infection. Encouraged to continue working on increasing protein intake and exercising incorporating strength training.

## 2024-07-25 NOTE — END OF VISIT
[Time Spent: ___ minutes] : I have spent [unfilled] minutes of time on the encounter. [FreeTextEntry3] :  All medical record entries made by the Scribe were at my, Dr. Malik Campo, direction and personally dictated by me on 07/24/2024 . I have reviewed the chart and agree that the record accurately reflects my personal performance of the history, physical exam, assessment and plan. I have also personally directed, reviewed, and agreed with the chart.

## 2024-08-13 ENCOUNTER — NON-APPOINTMENT (OUTPATIENT)
Age: 34
End: 2024-08-13

## 2024-08-14 ENCOUNTER — APPOINTMENT (OUTPATIENT)
Dept: PULMONOLOGY | Facility: CLINIC | Age: 34
End: 2024-08-14
Payer: COMMERCIAL

## 2024-08-14 VITALS
HEART RATE: 79 BPM | BODY MASS INDEX: 51.91 KG/M2 | RESPIRATION RATE: 12 BRPM | WEIGHT: 293 LBS | SYSTOLIC BLOOD PRESSURE: 102 MMHG | DIASTOLIC BLOOD PRESSURE: 72 MMHG | OXYGEN SATURATION: 99 % | TEMPERATURE: 97.2 F | HEIGHT: 62.99 IN

## 2024-08-14 PROCEDURE — 99213 OFFICE O/P EST LOW 20 MIN: CPT

## 2024-08-14 NOTE — PHYSICAL EXAM
[No Acute Distress] : no acute distress [Normal Appearance] : normal appearance [Normal Rate/Rhythm] : normal rate/rhythm [Normal S1, S2] : normal s1, s2 [No Murmurs] : no murmurs [No Resp Distress] : no resp distress [Clear to Auscultation Bilaterally] : clear to auscultation bilaterally [No Abnormalities] : no abnormalities [Benign] : benign [Normal Gait] : normal gait [No Clubbing] : no clubbing [No Edema] : no edema [Normal Color/ Pigmentation] : normal color/ pigmentation [No Focal Deficits] : no focal deficits [Oriented x3] : oriented x3 [Normal Affect] : normal affect [TextBox_11] : NC/AT

## 2024-08-14 NOTE — ASSESSMENT
[FreeTextEntry1] : Labs: WBC 7.24 (), Hgb 10.7, Plts 404 MCV 79.8; reductions in MCH and MCHC  2023 Na 139, K 4.3, Cl 103, HCO3 25, BUN/creat 11/0.67, glucose 87 Tbili 0.2, AST/ALT 20/19, Alk phos 76, TP/Albumin 6.8/3.9  IgE: 256 RAST: +cats, dogs, aspergillus, candida, cockroach  CXR (9/6/23): Impression: No acute abnormality visualized.  PFTs            2021 2023 2024 FEV1/FVC   72%            71%            71% FEV1           1.80, 73%   1.81, 71%   1.89, 79% FVC             2.49, 87%   2.57, 86%   2.65, 94% TLC             3.70, 83%    4.02, 83%   3.56, 80% RV               1.22, 94%    1.39, 123% 1.11, 82% ERV                                                     0.22, 19% DLCO          15.21, 61%  16.29, 78%   16.72, 68% -10/2023: Positive bronchodilator response  -4/2024: Positive bronchodilator response  ACT: 9/6/23: 24 8/2024: 25  ESS: 9/6/23: 19 1/10/24: 9  Home sleep study 2021: pAHI 35.6, virgie SpO2 81%; time spent <88%: 2.2% 2023: AHI 49, virgie SpO2 70%  CPAP Adherence report 4/2024: Usage >4 hours/night 1%. Leak 12.5 L/min. AHI 5.1. RITU 0.0   A/P: 35 yo F h/o childhood asthma and obesity c/b severe NIR returns to clinic after recent weight loss surgery (5/2024).  Ms. Griffin's asthma symptoms are well-controlled, and she is not having exacerbations. I think she is a good candidate for PRN LABA/ICS.  For her NIR, we discussed that with weight loss, she may not need NIPPV in the future. I would like her to continue CPAP for now, and we will repeat a sleep study once her weight has stabilized.   I recommended a PCV-20 given her history of asthma. She declined; she will consider at next visit  1. Moderate persistent asthma with exacerbations (1/2023, 9/27/23, 11/2023) 2. Obesity 3. Severe NIR 4. Tobacco use, achieved cessation 5. Marijuana use, no longer smoking  -PRN LABA/ICS -congratulated on weight loss; recommended continued low impact physical activity -continue CPAP -Vaccinations: declined PCV-20 today; will re-discuss at next visit -follow-up with me in 6 months to repeat sleep study and obtain PCV-20

## 2024-08-14 NOTE — REVIEW OF SYSTEMS
[Fatigue] : fatigue [Cough] : no cough [Chest Tightness] : no chest tightness [Sputum] : no sputum [Dyspnea] : no dyspnea [SOB on Exertion] : no sob on exertion [Negative] : Endocrine

## 2024-08-14 NOTE — HISTORY OF PRESENT ILLNESS
[Former] : former [Current] : current [TextBox_4] : Ms. Kahn is a 35 yo F h/o childhood asthma and obesity c/b NIR, pre-DM, and OA who was previously evaluated in our clinic for perioperative risk assessment (2021). Her evaluation included PFTs (mild obstruction, reduced DLCO, + bronchodilator response) and a home sleep study showing severe NIR (AHI of 35.6). At the time, Auto-PAP was prescribed but not utilized. Our evaluation has included CXR, PFTs, and sleep study.  10/2023:  We re-ordered a sleep study, increased LABA/ICS dose due to a recent exacerbation and discussed total smoking cessation.  11/8/2023:  Ms. Kahn is feeling sick (rhinorrhea, wheezing, cough w/ clear sputum). After increasing her Symbicort dosing, she felt "really well" for a while. She smokes marijuana 1-2 times/week. RVP done negative, asthma profile + cat, dog, cockroach, aspergillus, candida. Recommended indoor air filter. IgE 239. Sleep study showed AHI of 49 severe NIR so autopap was prescribed again  1/2024:  using cpap x 1 month, consistent use aside form missing it 2x/week. ESS 9 today. overall more energy during daytime with some perceived benefit from CPAP. Only having issues with mask comfort and leaks. Will have our sleep personel to mask study to see if there is a better fit for her. Also reports dry mouth. She is not humidfying her air so instructed her on that. For her asthma, she is doing well on her current regimen of Symbicort BID and PRN albuterol (2x/week). She still has marijuana use 2x/week. not smoking. no steroid or abx use in the last few months.   4/2024: Ms. Kahn is feeling well and has not had respiratory exacerbations since our last visit. She quit smoking tobacco and transitioned all THC use to enteral. She is using her Symbicort as-prescribed, and she is not needing her Albuterol. Her weight loss medication was transitioned to Tirzepatide, which causes more nausea and vomiting. She is wearing her CPAP at least 5 nights/week, though the adherence is not being detected.  8/2024: Since our last visit, Ms. Kahn underwent weight loss surgery (5/28/24). She had some pain afterward, but she is feeling well now. She has lost 20-30 pounds and has more energy. She has not had any respiratory symptoms or wheezing. She has not used her Albuterol nor Symbicort in 1-2 weeks. She has not been using CPAP consistently  PMH: Childhood asthma Anemia NIR, severe Pre-DM OA  FH: Maternal grandparent with breast cancer Mom with HTN  SH: Ms. Kahn lives in [facility]. She works for the [employer].  She smoked 2 cigarettes/day for 8 years (0.8 pack-years); she quit in 2023. She denies vaping. She drinks alcohol 1-2X/month. She smokes marijuana 2-3 times/week.  [TextBox_27] : 2x/week

## 2024-08-23 ENCOUNTER — APPOINTMENT (OUTPATIENT)
Dept: BARIATRICS | Facility: CLINIC | Age: 34
End: 2024-08-23
Payer: COMMERCIAL

## 2024-08-23 VITALS — BODY MASS INDEX: 52.81 KG/M2 | WEIGHT: 293 LBS

## 2024-08-23 PROCEDURE — 97803 MED NUTRITION INDIV SUBSEQ: CPT | Mod: 93

## 2024-09-04 ENCOUNTER — APPOINTMENT (OUTPATIENT)
Dept: BARIATRICS | Facility: CLINIC | Age: 34
End: 2024-09-04
Payer: COMMERCIAL

## 2024-09-04 ENCOUNTER — LABORATORY RESULT (OUTPATIENT)
Age: 34
End: 2024-09-04

## 2024-09-04 VITALS
TEMPERATURE: 97.1 F | WEIGHT: 290 LBS | SYSTOLIC BLOOD PRESSURE: 112 MMHG | DIASTOLIC BLOOD PRESSURE: 78 MMHG | OXYGEN SATURATION: 98 % | HEIGHT: 62.99 IN | BODY MASS INDEX: 51.38 KG/M2 | HEART RATE: 77 BPM

## 2024-09-04 PROCEDURE — 99214 OFFICE O/P EST MOD 30 MIN: CPT

## 2024-09-04 NOTE — ASSESSMENT
[FreeTextEntry1] : Randa Kahn is a 35 y/o F 3 months s/p MDS who presents today for a follow up viist. BMI today is 51 and patient has lost 56 lbs since surgery. Patient is doing well and denies n/v/c/d/ SOB, acid reflux, po intolerance, chest pain, abd pain, dizziness, fever and calf pain. Patient admits that she has been struggling to intake adequate protein and we reviewed different protein sources she can have to increase intake. Patient also mentions that she has recently started to exercise and we educated the patient on the importance of regular exercise to prevent bone or muscle loss. No other concerns at this time. Will obtain lab work to assess nutritional status and have patient meet nutritionist. Follow up in 3 months.

## 2024-09-04 NOTE — ADDENDUM
[FreeTextEntry1] :  Documented by Sudhir Lancaster acting as a scribe for MORIS Rosado  on 09/04/2024  .

## 2024-09-04 NOTE — END OF VISIT
[FreeTextEntry3] :  All medical record entries made by the Scribe were at my, MORIS Lock , direction and personally dictated by me on 09/04/2024 . I have reviewed the chart and agree that the record accurately reflects my personal performance of the history, physical exam, assessment and plan. I have also personally directed, reviewed, and agreed with the chart.  [Time Spent: ___ minutes] : I have spent [unfilled] minutes of time on the encounter which excludes teaching and separately reported services.

## 2024-09-04 NOTE — PLAN
[FreeTextEntry1] : Will obtain lab work to assess nutritional status and have patient meet nutritionist. Follow up in 3 months.

## 2024-09-04 NOTE — HISTORY OF PRESENT ILLNESS
[de-identified] : Randa Kahn is a 35 y/o F 3 months s/p MDS who presents today for a follow up viist. BMI today is 51 and patient has lost 56 lbs since surgery. Patient is doing well and denies n/v/c/d/ SOB, acid reflux, po intolerance, chest pain, abd pain, dizziness, fever and calf pain. Patient admits that she has been struggling to intake adequate protein and we reviewed different protein sources she can have to increase intake. Patient also mentions that she has recently started to exercise and we educated the patient on the importance of regular exercise to prevent bone or muscle loss. No other concerns at this time. Will obtain lab work to assess nutritional status and have patient meet nutritionist. Follow up in 3 months.

## 2024-09-10 LAB
25(OH)D3 SERPL-MCNC: 29.4 NG/ML
ALBUMIN SERPL ELPH-MCNC: 3.5 G/DL
ALP BLD-CCNC: 86 U/L
ALT SERPL-CCNC: 34 U/L
ANION GAP SERPL CALC-SCNC: 11 MMOL/L
AST SERPL-CCNC: 39 U/L
BASOPHILS # BLD AUTO: 0.04 K/UL
BASOPHILS NFR BLD AUTO: 0.7 %
BILIRUB SERPL-MCNC: 0.3 MG/DL
BUN SERPL-MCNC: 7 MG/DL
CALCIUM SERPL-MCNC: 9 MG/DL
CALCIUM SERPL-MCNC: 9 MG/DL
CHLORIDE SERPL-SCNC: 106 MMOL/L
CHOLEST SERPL-MCNC: 151 MG/DL
CO2 SERPL-SCNC: 26 MMOL/L
CREAT SERPL-MCNC: 0.71 MG/DL
EGFR: 114 ML/MIN/1.73M2
EOSINOPHIL # BLD AUTO: 0.31 K/UL
EOSINOPHIL NFR BLD AUTO: 5.3 %
ESTIMATED AVERAGE GLUCOSE: 105 MG/DL
FERRITIN SERPL-MCNC: 38 NG/ML
FOLATE SERPL-MCNC: 7.8 NG/ML
GLUCOSE SERPL-MCNC: 80 MG/DL
HBA1C MFR BLD HPLC: 5.3 %
HCT VFR BLD CALC: 32.1 %
HDLC SERPL-MCNC: 28 MG/DL
HGB BLD-MCNC: 9.5 G/DL
IMM GRANULOCYTES NFR BLD AUTO: 0.3 %
IRON SATN MFR SERPL: 5 %
IRON SERPL-MCNC: 17 UG/DL
LDLC SERPL CALC-MCNC: 105 MG/DL
LYMPHOCYTES # BLD AUTO: 1.92 K/UL
LYMPHOCYTES NFR BLD AUTO: 32.6 %
MAN DIFF?: NORMAL
MCHC RBC-ENTMCNC: 22.4 PG
MCHC RBC-ENTMCNC: 29.6 GM/DL
MCV RBC AUTO: 75.5 FL
MONOCYTES # BLD AUTO: 0.49 K/UL
MONOCYTES NFR BLD AUTO: 8.3 %
NEUTROPHILS # BLD AUTO: 3.11 K/UL
NEUTROPHILS NFR BLD AUTO: 52.8 %
NONHDLC SERPL-MCNC: 123 MG/DL
PARATHYROID HORMONE INTACT: 54 PG/ML
PLATELET # BLD AUTO: 364 K/UL
POTASSIUM SERPL-SCNC: 3.6 MMOL/L
PREALB SERPL NEPH-MCNC: 12 MG/DL
PROT SERPL-MCNC: 6.7 G/DL
RBC # BLD: 4.25 M/UL
RBC # FLD: 22.1 %
SODIUM SERPL-SCNC: 143 MMOL/L
TIBC SERPL-MCNC: 307 UG/DL
TRIGL SERPL-MCNC: 92 MG/DL
TSH SERPL-ACNC: 1.65 UIU/ML
UIBC SERPL-MCNC: 290 UG/DL
VIT B1 SERPL-MCNC: 125.1 NMOL/L
VIT B12 SERPL-MCNC: >2000 PG/ML
VIT B6 SERPL-MCNC: 5.5 UG/L
WBC # FLD AUTO: 5.89 K/UL
ZINC SERPL-MCNC: 78 UG/DL

## 2024-09-10 RX ORDER — IRON ASPGLY,PS/C/B12/FA/CA/SUC 150-25-1
50-100 CAPSULE ORAL
Qty: 30 | Refills: 3 | Status: ACTIVE | COMMUNITY
Start: 2024-09-10 | End: 1900-01-01

## 2024-09-11 DIAGNOSIS — Z98.84 BARIATRIC SURGERY STATUS: ICD-10-CM

## 2024-09-11 DIAGNOSIS — D50.9 IRON DEFICIENCY ANEMIA, UNSPECIFIED: ICD-10-CM

## 2024-09-14 LAB
A-TOCOPHEROL VIT E SERPL-MCNC: 7.7 MG/L
BETA+GAMMA TOCOPHEROL SERPL-MCNC: 2.3 MG/L
VIT A SERPL-MCNC: 21.5 UG/DL

## 2024-10-04 ENCOUNTER — APPOINTMENT (OUTPATIENT)
Dept: BARIATRICS | Facility: CLINIC | Age: 34
End: 2024-10-04
Payer: COMMERCIAL

## 2024-10-04 VITALS — BODY MASS INDEX: 50.68 KG/M2 | WEIGHT: 286 LBS

## 2024-10-04 PROCEDURE — 98968 PH1 ASSMT&MGMT NQHP 21-30: CPT

## 2024-12-04 ENCOUNTER — LABORATORY RESULT (OUTPATIENT)
Age: 34
End: 2024-12-04

## 2024-12-04 ENCOUNTER — APPOINTMENT (OUTPATIENT)
Dept: BARIATRICS | Facility: CLINIC | Age: 34
End: 2024-12-04
Payer: COMMERCIAL

## 2024-12-04 VITALS
BODY MASS INDEX: 47.48 KG/M2 | OXYGEN SATURATION: 100 % | HEIGHT: 62 IN | WEIGHT: 258 LBS | DIASTOLIC BLOOD PRESSURE: 80 MMHG | SYSTOLIC BLOOD PRESSURE: 116 MMHG | HEART RATE: 72 BPM | TEMPERATURE: 97.2 F

## 2024-12-04 DIAGNOSIS — Z98.84 BARIATRIC SURGERY STATUS: ICD-10-CM

## 2024-12-04 PROCEDURE — 99214 OFFICE O/P EST MOD 30 MIN: CPT

## 2024-12-09 LAB
25(OH)D3 SERPL-MCNC: 27.3 NG/ML
A-TOCOPHEROL VIT E SERPL-MCNC: 8.4 MG/L
ALBUMIN SERPL ELPH-MCNC: 3.6 G/DL
ALP BLD-CCNC: 86 U/L
ALT SERPL-CCNC: 25 U/L
ANION GAP SERPL CALC-SCNC: 12 MMOL/L
AST SERPL-CCNC: 31 U/L
BASOPHILS # BLD AUTO: 0.03 K/UL
BASOPHILS NFR BLD AUTO: 0.5 %
BETA+GAMMA TOCOPHEROL SERPL-MCNC: 2.3 MG/L
BILIRUB SERPL-MCNC: 0.3 MG/DL
BUN SERPL-MCNC: 11 MG/DL
CALCIUM SERPL-MCNC: 8.7 MG/DL
CALCIUM SERPL-MCNC: 8.7 MG/DL
CHLORIDE SERPL-SCNC: 106 MMOL/L
CHOLEST SERPL-MCNC: 150 MG/DL
CO2 SERPL-SCNC: 26 MMOL/L
CREAT SERPL-MCNC: 0.69 MG/DL
EGFR: 117 ML/MIN/1.73M2
EOSINOPHIL # BLD AUTO: 0.29 K/UL
EOSINOPHIL NFR BLD AUTO: 5.2 %
ESTIMATED AVERAGE GLUCOSE: 108 MG/DL
FERRITIN SERPL-MCNC: 32 NG/ML
FOLATE SERPL-MCNC: 9.7 NG/ML
GLUCOSE SERPL-MCNC: 77 MG/DL
HBA1C MFR BLD HPLC: 5.4 %
HCT VFR BLD CALC: 32.5 %
HDLC SERPL-MCNC: 29 MG/DL
HGB BLD-MCNC: 9.6 G/DL
IMM GRANULOCYTES NFR BLD AUTO: 0.2 %
IRON SATN MFR SERPL: 6 %
IRON SERPL-MCNC: 20 UG/DL
LACTATE BLDA-MCNC: 0.6 MMOL/L
LDLC SERPL CALC-MCNC: 106 MG/DL
LYMPHOCYTES # BLD AUTO: 1.82 K/UL
LYMPHOCYTES NFR BLD AUTO: 32.6 %
MAN DIFF?: NORMAL
MCHC RBC-ENTMCNC: 23.4 PG
MCHC RBC-ENTMCNC: 29.5 G/DL
MCV RBC AUTO: 79.1 FL
MONOCYTES # BLD AUTO: 0.4 K/UL
MONOCYTES NFR BLD AUTO: 7.2 %
NEUTROPHILS # BLD AUTO: 3.03 K/UL
NEUTROPHILS NFR BLD AUTO: 54.3 %
NONHDLC SERPL-MCNC: 121 MG/DL
PARATHYROID HORMONE INTACT: 61 PG/ML
PLATELET # BLD AUTO: 400 K/UL
POTASSIUM SERPL-SCNC: 3.9 MMOL/L
PREALB SERPL NEPH-MCNC: 13 MG/DL
PROT SERPL-MCNC: 6.7 G/DL
RBC # BLD: 4.11 M/UL
RBC # FLD: 22.9 %
SODIUM SERPL-SCNC: 143 MMOL/L
TIBC SERPL-MCNC: 316 UG/DL
TRIGL SERPL-MCNC: 73 MG/DL
TSH SERPL-ACNC: 1.25 UIU/ML
UIBC SERPL-MCNC: 296 UG/DL
VIT B1 SERPL-MCNC: 103.3 NMOL/L
VIT B12 SERPL-MCNC: >2000 PG/ML
WBC # FLD AUTO: 5.58 K/UL
ZINC SERPL-MCNC: 59 UG/DL

## 2024-12-10 ENCOUNTER — NON-APPOINTMENT (OUTPATIENT)
Age: 34
End: 2024-12-10

## 2024-12-10 LAB — VIT B6 SERPL-MCNC: 11.2 UG/L

## 2024-12-12 ENCOUNTER — NON-APPOINTMENT (OUTPATIENT)
Age: 34
End: 2024-12-12

## 2024-12-12 LAB — VIT A SERPL-MCNC: 18.5 UG/DL

## 2025-01-06 ENCOUNTER — NON-APPOINTMENT (OUTPATIENT)
Age: 35
End: 2025-01-06

## 2025-01-06 ENCOUNTER — APPOINTMENT (OUTPATIENT)
Dept: HEMATOLOGY ONCOLOGY | Facility: CLINIC | Age: 35
End: 2025-01-06
Payer: COMMERCIAL

## 2025-01-06 VITALS
HEIGHT: 62 IN | HEART RATE: 87 BPM | OXYGEN SATURATION: 99 % | BODY MASS INDEX: 45.82 KG/M2 | DIASTOLIC BLOOD PRESSURE: 78 MMHG | TEMPERATURE: 97.1 F | SYSTOLIC BLOOD PRESSURE: 116 MMHG | WEIGHT: 249 LBS

## 2025-01-06 DIAGNOSIS — D50.9 IRON DEFICIENCY ANEMIA, UNSPECIFIED: ICD-10-CM

## 2025-01-06 DIAGNOSIS — F50.89 OTHER SPECIFIED EATING DISORDER: ICD-10-CM

## 2025-01-06 DIAGNOSIS — R53.83 OTHER FATIGUE: ICD-10-CM

## 2025-01-06 LAB
RBC # BLD: 4.24 M/UL
RETICS # AUTO: 1.6 %
RETICS AGGREG/RBC NFR: 67 K/UL

## 2025-01-06 PROCEDURE — 36415 COLL VENOUS BLD VENIPUNCTURE: CPT

## 2025-01-06 PROCEDURE — 99204 OFFICE O/P NEW MOD 45 MIN: CPT

## 2025-01-07 ENCOUNTER — NON-APPOINTMENT (OUTPATIENT)
Age: 35
End: 2025-01-07

## 2025-01-07 LAB
ALBUMIN SERPL ELPH-MCNC: 3.4 G/DL
ALP BLD-CCNC: 89 U/L
ALT SERPL-CCNC: 24 U/L
ANION GAP SERPL CALC-SCNC: 13 MMOL/L
AST SERPL-CCNC: 34 U/L
BILIRUB SERPL-MCNC: 0.2 MG/DL
BUN SERPL-MCNC: 8 MG/DL
CALCIUM SERPL-MCNC: 8.5 MG/DL
CHLORIDE SERPL-SCNC: 106 MMOL/L
CO2 SERPL-SCNC: 23 MMOL/L
CREAT SERPL-MCNC: 0.62 MG/DL
EGFR: 120 ML/MIN/1.73M2
EOSINOPHIL NFR BLD AUTO: 4 %
EOSINOPHIL NFR BLD AUTO: 4 %
FERRITIN SERPL-MCNC: 44 NG/ML
FOLATE SERPL-MCNC: 10.8 NG/ML
GIANT PLATELETS BLD QL SMEAR: PRESENT
GLUCOSE SERPL-MCNC: 65 MG/DL
HCT VFR BLD CALC: 34 %
HGB BLD-MCNC: 10.2 G/DL
HYPOCHROMIA BLD QL SMEAR: NORMAL
IRON SATN MFR SERPL: 5 %
IRON SERPL-MCNC: 17 UG/DL
LYMPHOCYTES # BLD AUTO: 1.92 K/UL
LYMPHOCYTES # BLD AUTO: 1.92 K/UL
LYMPHOCYTES NFR BLD AUTO: 28 %
LYMPHOCYTES NFR BLD AUTO: 28 %
MAN DIFF?: NORMAL
MCHC RBC-ENTMCNC: 24.1 PG
MCHC RBC-ENTMCNC: 30 G/DL
MCV RBC AUTO: 80.2 FL
MONOCYTES NFR BLD AUTO: 4 %
MONOCYTES NFR BLD AUTO: 4 %
MSMEAR: NORMAL
NEUTROPHILS # BLD AUTO: 4.31 K/UL
NEUTROPHILS # BLD AUTO: 4.31 K/UL
NEUTROPHILS NFR BLD AUTO: 64 %
NEUTROPHILS NFR BLD AUTO: 64 %
OVALOCYTES BLD QL SMEAR: SLIGHT
PLAT MORPH BLD: ABNORMAL
PLATELET # BLD AUTO: 351 K/UL
POIKILOCYTOSIS BLD QL SMEAR: SLIGHT
POLYCHROMASIA BLD QL SMEAR: NORMAL
POTASSIUM SERPL-SCNC: 4 MMOL/L
PROT SERPL-MCNC: 6.5 G/DL
RBC # BLD: 4.24 M/UL
RBC # FLD: 21.6 %
RBC BLD AUTO: ABNORMAL
SMUDGE CELLS # BLD: PRESENT
SODIUM SERPL-SCNC: 142 MMOL/L
STOMATOCYTES BLD QL SMEAR: SLIGHT
TARGETS BLD QL SMEAR: SLIGHT
TIBC SERPL-MCNC: 323 UG/DL
UIBC SERPL-MCNC: 306 UG/DL
VIT B12 SERPL-MCNC: >2000 PG/ML
WBC # FLD AUTO: 6.77 K/UL

## 2025-01-21 ENCOUNTER — APPOINTMENT (OUTPATIENT)
Dept: INFUSION THERAPY | Facility: CLINIC | Age: 35
End: 2025-01-21

## 2025-01-21 ENCOUNTER — OUTPATIENT (OUTPATIENT)
Dept: OUTPATIENT SERVICES | Facility: HOSPITAL | Age: 35
LOS: 1 days | End: 2025-01-21
Payer: COMMERCIAL

## 2025-01-21 VITALS
SYSTOLIC BLOOD PRESSURE: 105 MMHG | HEIGHT: 63 IN | WEIGHT: 293 LBS | HEART RATE: 54 BPM | TEMPERATURE: 98 F | RESPIRATION RATE: 18 BRPM | DIASTOLIC BLOOD PRESSURE: 74 MMHG | OXYGEN SATURATION: 100 %

## 2025-01-21 VITALS
OXYGEN SATURATION: 99 % | DIASTOLIC BLOOD PRESSURE: 72 MMHG | RESPIRATION RATE: 18 BRPM | TEMPERATURE: 99 F | SYSTOLIC BLOOD PRESSURE: 104 MMHG | HEART RATE: 63 BPM

## 2025-01-21 DIAGNOSIS — D50.9 IRON DEFICIENCY ANEMIA, UNSPECIFIED: ICD-10-CM

## 2025-01-21 PROCEDURE — 96365 THER/PROPH/DIAG IV INF INIT: CPT

## 2025-01-21 RX ORDER — FERUMOXYTOL 510 MG/17ML
510 INJECTION INTRAVENOUS ONCE
Refills: 0 | Status: COMPLETED | OUTPATIENT
Start: 2025-01-21 | End: 2025-01-21

## 2025-01-21 RX ADMIN — FERUMOXYTOL 510 MILLIGRAM(S): 510 INJECTION INTRAVENOUS at 17:49

## 2025-01-21 RX ADMIN — FERUMOXYTOL 234 MILLIGRAM(S): 510 INJECTION INTRAVENOUS at 17:19

## 2025-01-23 ENCOUNTER — NON-APPOINTMENT (OUTPATIENT)
Age: 35
End: 2025-01-23

## 2025-01-28 ENCOUNTER — APPOINTMENT (OUTPATIENT)
Dept: INFUSION THERAPY | Facility: CLINIC | Age: 35
End: 2025-01-28

## 2025-01-28 ENCOUNTER — OUTPATIENT (OUTPATIENT)
Dept: OUTPATIENT SERVICES | Facility: HOSPITAL | Age: 35
LOS: 1 days | End: 2025-01-28
Payer: COMMERCIAL

## 2025-01-28 VITALS
RESPIRATION RATE: 18 BRPM | HEART RATE: 80 BPM | SYSTOLIC BLOOD PRESSURE: 104 MMHG | OXYGEN SATURATION: 99 % | TEMPERATURE: 98 F | DIASTOLIC BLOOD PRESSURE: 66 MMHG

## 2025-01-28 VITALS
HEART RATE: 71 BPM | WEIGHT: 246.92 LBS | TEMPERATURE: 99 F | RESPIRATION RATE: 18 BRPM | HEIGHT: 63 IN | DIASTOLIC BLOOD PRESSURE: 69 MMHG | OXYGEN SATURATION: 98 % | SYSTOLIC BLOOD PRESSURE: 101 MMHG

## 2025-01-28 DIAGNOSIS — D50.9 IRON DEFICIENCY ANEMIA, UNSPECIFIED: ICD-10-CM

## 2025-01-28 PROCEDURE — 96365 THER/PROPH/DIAG IV INF INIT: CPT

## 2025-01-28 RX ORDER — FERUMOXYTOL 510 MG/17ML
510 INJECTION INTRAVENOUS ONCE
Refills: 0 | Status: COMPLETED | OUTPATIENT
Start: 2025-01-28 | End: 2025-01-28

## 2025-01-28 RX ADMIN — FERUMOXYTOL 510 MILLIGRAM(S): 510 INJECTION INTRAVENOUS at 17:12

## 2025-01-28 RX ADMIN — FERUMOXYTOL 234 MILLIGRAM(S): 510 INJECTION INTRAVENOUS at 16:40

## 2025-02-12 ENCOUNTER — NON-APPOINTMENT (OUTPATIENT)
Age: 35
End: 2025-02-12

## 2025-02-12 ENCOUNTER — APPOINTMENT (OUTPATIENT)
Dept: PULMONOLOGY | Facility: CLINIC | Age: 35
End: 2025-02-12
Payer: COMMERCIAL

## 2025-02-12 VITALS
RESPIRATION RATE: 12 BRPM | WEIGHT: 239 LBS | BODY MASS INDEX: 43.98 KG/M2 | SYSTOLIC BLOOD PRESSURE: 101 MMHG | HEART RATE: 71 BPM | TEMPERATURE: 97.1 F | OXYGEN SATURATION: 98 % | HEIGHT: 62 IN | DIASTOLIC BLOOD PRESSURE: 69 MMHG

## 2025-02-12 DIAGNOSIS — J45.909 UNSPECIFIED ASTHMA, UNCOMPLICATED: ICD-10-CM

## 2025-02-12 DIAGNOSIS — G47.33 OBSTRUCTIVE SLEEP APNEA (ADULT) (PEDIATRIC): ICD-10-CM

## 2025-02-12 PROCEDURE — 99213 OFFICE O/P EST LOW 20 MIN: CPT

## 2025-02-20 ENCOUNTER — APPOINTMENT (OUTPATIENT)
Dept: HEMATOLOGY ONCOLOGY | Facility: CLINIC | Age: 35
End: 2025-02-20
Payer: COMMERCIAL

## 2025-02-20 VITALS
HEIGHT: 62 IN | SYSTOLIC BLOOD PRESSURE: 100 MMHG | OXYGEN SATURATION: 96 % | HEART RATE: 77 BPM | WEIGHT: 235 LBS | BODY MASS INDEX: 43.24 KG/M2 | TEMPERATURE: 96.2 F | DIASTOLIC BLOOD PRESSURE: 60 MMHG

## 2025-02-20 DIAGNOSIS — R53.83 OTHER FATIGUE: ICD-10-CM

## 2025-02-20 DIAGNOSIS — F50.89 OTHER SPECIFIED EATING DISORDER: ICD-10-CM

## 2025-02-20 DIAGNOSIS — D50.9 IRON DEFICIENCY ANEMIA, UNSPECIFIED: ICD-10-CM

## 2025-02-20 DIAGNOSIS — D50.0 IRON DEFICIENCY ANEMIA SECONDARY TO BLOOD LOSS (CHRONIC): ICD-10-CM

## 2025-02-20 PROCEDURE — 99214 OFFICE O/P EST MOD 30 MIN: CPT

## 2025-03-02 LAB
ANISOCYTOSIS BLD QL: NORMAL
BASOPHILS # BLD AUTO: 0.18 K/UL
BASOPHILS # BLD AUTO: 0.18 K/UL
BASOPHILS NFR BLD AUTO: 2.6 %
BASOPHILS NFR BLD AUTO: 2.6 %
EOSINOPHIL # BLD AUTO: 0.64 K/UL
EOSINOPHIL # BLD AUTO: 0.64 K/UL
EOSINOPHIL NFR BLD AUTO: 9.5 %
EOSINOPHIL NFR BLD AUTO: 9.5 %
FERRITIN SERPL-MCNC: 356 NG/ML
FOLATE SERPL-MCNC: 7.5 NG/ML
GIANT PLATELETS BLD QL SMEAR: PRESENT
HCT VFR BLD CALC: 38.4 %
HGB BLD-MCNC: 11.6 G/DL
IRON SATN MFR SERPL: 10 %
IRON SERPL-MCNC: 25 UG/DL
LYMPHOCYTES # BLD AUTO: 2.36 K/UL
LYMPHOCYTES # BLD AUTO: 2.36 K/UL
LYMPHOCYTES NFR BLD AUTO: 34.8 %
LYMPHOCYTES NFR BLD AUTO: 34.8 %
MACROCYTES BLD QL SMEAR: SLIGHT
MAN DIFF?: NORMAL
MANUAL SMEAR: NORMAL
MCHC RBC-ENTMCNC: 26.2 PG
MCHC RBC-ENTMCNC: 30.2 G/DL
MCV RBC AUTO: 86.9 FL
MICROCYTES BLD QL SMEAR: SLIGHT
MONOCYTES # BLD AUTO: 0.65 K/UL
MONOCYTES # BLD AUTO: 0.65 K/UL
MONOCYTES NFR BLD AUTO: 9.6 %
MONOCYTES NFR BLD AUTO: 9.6 %
NEUTROPHILS # BLD AUTO: 2.94 K/UL
NEUTROPHILS # BLD AUTO: 2.94 K/UL
NEUTROPHILS NFR BLD AUTO: 43.5 %
NEUTROPHILS NFR BLD AUTO: 43.5 %
OVALOCYTES BLD QL SMEAR: SLIGHT
PLAT MORPH BLD: NORMAL
PLATELET # BLD AUTO: 263 K/UL
POIKILOCYTOSIS BLD QL SMEAR: SLIGHT
POLYCHROMASIA BLD QL SMEAR: SLIGHT
RBC # BLD: 4.42 M/UL
RBC # BLD: 4.42 M/UL
RBC # FLD: 26.5 %
RBC BLD AUTO: ABNORMAL
RETICS # AUTO: 1.4 %
RETICS AGGREG/RBC NFR: 61.4 K/UL
TIBC SERPL-MCNC: 255 UG/DL
UIBC SERPL-MCNC: 230 UG/DL
VIT B12 SERPL-MCNC: >2000 PG/ML
WBC # FLD AUTO: 6.77 K/UL

## 2025-03-06 ENCOUNTER — APPOINTMENT (OUTPATIENT)
Dept: SLEEP CENTER | Facility: HOME HEALTH | Age: 35
End: 2025-03-06

## 2025-03-19 ENCOUNTER — LABORATORY RESULT (OUTPATIENT)
Age: 35
End: 2025-03-19

## 2025-03-19 ENCOUNTER — APPOINTMENT (OUTPATIENT)
Dept: SURGERY | Facility: CLINIC | Age: 35
End: 2025-03-19

## 2025-03-19 ENCOUNTER — APPOINTMENT (OUTPATIENT)
Dept: BARIATRICS | Facility: CLINIC | Age: 35
End: 2025-03-19
Payer: COMMERCIAL

## 2025-03-19 VITALS
DIASTOLIC BLOOD PRESSURE: 73 MMHG | SYSTOLIC BLOOD PRESSURE: 105 MMHG | WEIGHT: 225 LBS | TEMPERATURE: 97.4 F | BODY MASS INDEX: 41.41 KG/M2 | HEART RATE: 60 BPM | OXYGEN SATURATION: 100 % | HEIGHT: 62 IN

## 2025-03-19 DIAGNOSIS — Z98.84 BARIATRIC SURGERY STATUS: ICD-10-CM

## 2025-03-19 PROCEDURE — 99214 OFFICE O/P EST MOD 30 MIN: CPT

## 2025-03-27 ENCOUNTER — APPOINTMENT (OUTPATIENT)
Dept: BARIATRICS | Facility: CLINIC | Age: 35
End: 2025-03-27
Payer: COMMERCIAL

## 2025-03-27 PROCEDURE — 97803 MED NUTRITION INDIV SUBSEQ: CPT | Mod: 93

## 2025-03-28 ENCOUNTER — NON-APPOINTMENT (OUTPATIENT)
Age: 35
End: 2025-03-28

## 2025-03-28 LAB
25(OH)D3 SERPL-MCNC: 27 NG/ML
ALBUMIN SERPL ELPH-MCNC: 5 G/DL
ALP BLD-CCNC: 82 U/L
ALT SERPL-CCNC: 23 U/L
ANION GAP SERPL CALC-SCNC: 11 MMOL/L
AST SERPL-CCNC: 29 U/L
BASOPHILS # BLD AUTO: 0.19 K/UL
BASOPHILS NFR BLD AUTO: 2.7 %
BILIRUB SERPL-MCNC: 0.2 MG/DL
BUN SERPL-MCNC: 10 MG/DL
CALCIUM SERPL-MCNC: 8.9 MG/DL
CALCIUM SERPL-MCNC: 8.9 MG/DL
CHLORIDE SERPL-SCNC: 104 MMOL/L
CHOLEST SERPL-MCNC: 150 MG/DL
CO2 SERPL-SCNC: 27 MMOL/L
CREAT SERPL-MCNC: 0.72 MG/DL
EGFRCR SERPLBLD CKD-EPI 2021: 112 ML/MIN/1.73M2
EOSINOPHIL # BLD AUTO: 0.25 K/UL
EOSINOPHIL NFR BLD AUTO: 3.5 %
ESTIMATED AVERAGE GLUCOSE: 103 MG/DL
FERRITIN SERPL-MCNC: 373 NG/ML
FOLATE SERPL-MCNC: 8.6 NG/ML
GLUCOSE SERPL-MCNC: 77 MG/DL
HBA1C MFR BLD HPLC: 5.2 %
HCT VFR BLD CALC: 40.3 %
HDLC SERPL-MCNC: 28 MG/DL
HGB BLD-MCNC: 12.4 G/DL
IRON SATN MFR SERPL: 16 %
IRON SERPL-MCNC: 39 UG/DL
LDLC SERPL-MCNC: 108 MG/DL
LYMPHOCYTES # BLD AUTO: 2.57 K/UL
LYMPHOCYTES NFR BLD AUTO: 36.3 %
MAN DIFF?: NORMAL
MCHC RBC-ENTMCNC: 27.6 PG
MCHC RBC-ENTMCNC: 30.8 G/DL
MCV RBC AUTO: 89.6 FL
MONOCYTES # BLD AUTO: 0.62 K/UL
MONOCYTES NFR BLD AUTO: 8.8 %
NEUTROPHILS # BLD AUTO: 3.44 K/UL
NEUTROPHILS NFR BLD AUTO: 48.7 %
NONHDLC SERPL-MCNC: 122 MG/DL
PARATHYROID HORMONE INTACT: 53 PG/ML
PLATELET # BLD AUTO: 318 K/UL
POTASSIUM SERPL-SCNC: 3.7 MMOL/L
PREALB SERPL NEPH-MCNC: 13 MG/DL
PROT SERPL-MCNC: 6.8 G/DL
RBC # BLD: 4.5 M/UL
RBC # FLD: 25.4 %
SODIUM SERPL-SCNC: 141 MMOL/L
TIBC SERPL-MCNC: 239 UG/DL
TRIGL SERPL-MCNC: 74 MG/DL
TSH SERPL-ACNC: 1.8 UIU/ML
UIBC SERPL-MCNC: 200 UG/DL
VIT B1 SERPL-MCNC: 132.1 NMOL/L
VIT B12 SERPL-MCNC: >2000 PG/ML
VIT B6 SERPL-MCNC: 10.7 UG/L
WBC # FLD AUTO: 7.07 K/UL
ZINC SERPL-MCNC: 67 UG/DL

## 2025-03-29 LAB
A-TOCOPHEROL VIT E SERPL-MCNC: 10 MG/L
BETA+GAMMA TOCOPHEROL SERPL-MCNC: 2.1 MG/L
VIT A SERPL-MCNC: 19.9 UG/DL

## 2025-06-03 ENCOUNTER — NON-APPOINTMENT (OUTPATIENT)
Age: 35
End: 2025-06-03

## 2025-06-11 ENCOUNTER — APPOINTMENT (OUTPATIENT)
Dept: BARIATRICS | Facility: CLINIC | Age: 35
End: 2025-06-11
Payer: COMMERCIAL

## 2025-06-11 VITALS
HEART RATE: 60 BPM | SYSTOLIC BLOOD PRESSURE: 107 MMHG | DIASTOLIC BLOOD PRESSURE: 74 MMHG | OXYGEN SATURATION: 100 % | WEIGHT: 223.38 LBS | BODY MASS INDEX: 41.11 KG/M2 | TEMPERATURE: 97.3 F | HEIGHT: 62 IN

## 2025-06-11 LAB
APTT BLD: 28.9 SEC
BASOPHILS # BLD AUTO: 0.05 K/UL
BASOPHILS NFR BLD AUTO: 0.9 %
EOSINOPHIL # BLD AUTO: 0.44 K/UL
EOSINOPHIL NFR BLD AUTO: 8.2 %
HCT VFR BLD CALC: 39.2 %
HGB BLD-MCNC: 11.8 G/DL
IMM GRANULOCYTES NFR BLD AUTO: 0.4 %
INR PPP: 0.92 RATIO
LYMPHOCYTES # BLD AUTO: 1.98 K/UL
LYMPHOCYTES NFR BLD AUTO: 37.1 %
MAN DIFF?: NORMAL
MCHC RBC-ENTMCNC: 30.1 G/DL
MCHC RBC-ENTMCNC: 30.6 PG
MCV RBC AUTO: 101.6 FL
MONOCYTES # BLD AUTO: 0.54 K/UL
MONOCYTES NFR BLD AUTO: 10.1 %
NEUTROPHILS # BLD AUTO: 2.31 K/UL
NEUTROPHILS NFR BLD AUTO: 43.3 %
PLATELET # BLD AUTO: 257 K/UL
PT BLD: 10.9 SEC
RBC # BLD: 3.86 M/UL
RBC # FLD: 16.5 %
WBC # FLD AUTO: 5.34 K/UL

## 2025-06-11 PROCEDURE — 99215 OFFICE O/P EST HI 40 MIN: CPT

## 2025-06-12 LAB
25(OH)D3 SERPL-MCNC: 26.6 NG/ML
ALBUMIN SERPL ELPH-MCNC: 3.8 G/DL
ALP BLD-CCNC: 83 U/L
ALT SERPL-CCNC: 43 U/L
ANION GAP SERPL CALC-SCNC: 13 MMOL/L
AST SERPL-CCNC: 40 U/L
BILIRUB SERPL-MCNC: 0.4 MG/DL
BUN SERPL-MCNC: 9 MG/DL
CALCIUM SERPL-MCNC: 8.8 MG/DL
CALCIUM SERPL-MCNC: 8.8 MG/DL
CHLORIDE SERPL-SCNC: 107 MMOL/L
CHOLEST SERPL-MCNC: 149 MG/DL
CO2 SERPL-SCNC: 22 MMOL/L
CREAT SERPL-MCNC: 0.69 MG/DL
EGFRCR SERPLBLD CKD-EPI 2021: 116 ML/MIN/1.73M2
ESTIMATED AVERAGE GLUCOSE: 94 MG/DL
FOLATE SERPL-MCNC: 8.2 NG/ML
GLUCOSE SERPL-MCNC: 73 MG/DL
HBA1C MFR BLD HPLC: 4.9 %
HDLC SERPL-MCNC: 42 MG/DL
IRON SATN MFR SERPL: 10 %
IRON SERPL-MCNC: 32 UG/DL
LDLC SERPL-MCNC: 94 MG/DL
NONHDLC SERPL-MCNC: 107 MG/DL
PARATHYROID HORMONE INTACT: 51 PG/ML
POTASSIUM SERPL-SCNC: 3.8 MMOL/L
PREALB SERPL NEPH-MCNC: 16 MG/DL
PROT SERPL-MCNC: 6.3 G/DL
SODIUM SERPL-SCNC: 142 MMOL/L
TIBC SERPL-MCNC: 318 UG/DL
TRIGL SERPL-MCNC: 63 MG/DL
TSH SERPL-ACNC: 1.48 UIU/ML
UIBC SERPL-MCNC: 286 UG/DL
VIT B12 SERPL-MCNC: >2000 PG/ML

## 2025-06-13 LAB — CA-I SERPL-SCNC: 5 MG/DL

## 2025-06-16 LAB — ZINC SERPL-MCNC: 44 UG/DL

## 2025-06-17 LAB
A-TOCOPHEROL VIT E SERPL-MCNC: 9.1 MG/L
BETA+GAMMA TOCOPHEROL SERPL-MCNC: 1.5 MG/L
VIT A SERPL-MCNC: 22.2 UG/DL
VIT B1 SERPL-MCNC: 129.6 NMOL/L

## 2025-06-25 ENCOUNTER — APPOINTMENT (OUTPATIENT)
Dept: BARIATRICS | Facility: CLINIC | Age: 35
End: 2025-06-25
Payer: COMMERCIAL

## 2025-06-25 ENCOUNTER — APPOINTMENT (OUTPATIENT)
Dept: HEART AND VASCULAR | Facility: CLINIC | Age: 35
End: 2025-06-25
Payer: COMMERCIAL

## 2025-06-25 VITALS
DIASTOLIC BLOOD PRESSURE: 77 MMHG | WEIGHT: 217 LBS | HEART RATE: 55 BPM | OXYGEN SATURATION: 100 % | BODY MASS INDEX: 39.93 KG/M2 | TEMPERATURE: 97.4 F | HEIGHT: 62 IN | SYSTOLIC BLOOD PRESSURE: 111 MMHG

## 2025-06-25 VITALS
HEIGHT: 62 IN | OXYGEN SATURATION: 98 % | BODY MASS INDEX: 38.64 KG/M2 | WEIGHT: 210 LBS | HEART RATE: 70 BPM | DIASTOLIC BLOOD PRESSURE: 60 MMHG | TEMPERATURE: 98 F | SYSTOLIC BLOOD PRESSURE: 118 MMHG

## 2025-06-25 PROCEDURE — 99215 OFFICE O/P EST HI 40 MIN: CPT

## 2025-06-25 PROCEDURE — 99214 OFFICE O/P EST MOD 30 MIN: CPT | Mod: 25

## 2025-06-25 PROCEDURE — 93000 ELECTROCARDIOGRAM COMPLETE: CPT

## 2025-06-26 RX ORDER — MIDODRINE HYDROCHLORIDE 2.5 MG/1
2.5 TABLET ORAL TWICE DAILY
Qty: 60 | Refills: 0 | Status: ACTIVE | COMMUNITY
Start: 2025-06-26 | End: 1900-01-01

## 2025-07-15 ENCOUNTER — APPOINTMENT (OUTPATIENT)
Dept: BARIATRICS | Facility: CLINIC | Age: 35
End: 2025-07-15
Payer: COMMERCIAL

## 2025-07-15 PROCEDURE — 97803 MED NUTRITION INDIV SUBSEQ: CPT | Mod: 93

## 2025-07-16 ENCOUNTER — APPOINTMENT (OUTPATIENT)
Dept: HEART AND VASCULAR | Facility: CLINIC | Age: 35
End: 2025-07-16
Payer: COMMERCIAL

## 2025-07-16 PROBLEM — R55 SYNCOPE: Status: ACTIVE | Noted: 2025-06-25

## 2025-07-16 PROCEDURE — 93015 CV STRESS TEST SUPVJ I&R: CPT

## 2025-07-16 PROCEDURE — 93306 TTE W/DOPPLER COMPLETE: CPT

## 2025-07-16 PROCEDURE — 99214 OFFICE O/P EST MOD 30 MIN: CPT | Mod: 25

## 2025-08-13 ENCOUNTER — NON-APPOINTMENT (OUTPATIENT)
Age: 35
End: 2025-08-13

## 2025-08-25 ENCOUNTER — APPOINTMENT (OUTPATIENT)
Dept: PULMONOLOGY | Facility: CLINIC | Age: 35
End: 2025-08-25

## (undated) DEVICE — SUT SILK 2-0 30" PSL

## (undated) DEVICE — STAPLER COVIDIEN ENDO GIA XL HANDLE

## (undated) DEVICE — SUT MONOCRYL 4-0 27" PS-2 UNDYED

## (undated) DEVICE — CATH NG SALEM SUMP 16FR

## (undated) DEVICE — STAPLER ECHELON FLEX POWERED PLUS 440MM

## (undated) DEVICE — SYR LUER LOK 30CC

## (undated) DEVICE — SUT PDS II 2-0 27" SH

## (undated) DEVICE — APPLICATOR FOR ARISTA XL-R RIGID 38CM

## (undated) DEVICE — TUBING STRYKER PNEUMOCLEAR HIGH FLOW

## (undated) DEVICE — TROCAR ETHICON ENDOPATH XCEL BLADELESS 12MM X 100MM STABILITY

## (undated) DEVICE — POSITIONER SAGE MOBILITY TRANSFER PAD

## (undated) DEVICE — SUT PDO 2-0 1/2 CIRCLE 26MM NDL 15CM

## (undated) DEVICE — GLV 6.5 PROTEXIS (WHITE)

## (undated) DEVICE — DRSG DERMABOND 0.7ML

## (undated) DEVICE — DRAPE 1/2 SHEET 40X57"

## (undated) DEVICE — Device

## (undated) DEVICE — VENODYNE/SCD SLEEVE CALF BARIATRIC

## (undated) DEVICE — DRAIN BLAKE 10FR ROUND

## (undated) DEVICE — LIGASURE MARYLAND 37CM

## (undated) DEVICE — PACK GENERAL LAPAROSCOPY

## (undated) DEVICE — TROCAR ETHICON ENDOPATH XCEL UNIVERSAL SLEEVE 12MM X 100M STABILITY

## (undated) DEVICE — POSITIONER FOAM EGG CRATE ULNAR 2PCS (PINK)

## (undated) DEVICE — ENDOCATCH II 15MM

## (undated) DEVICE — TROCAR ETHICON ENDOPATH XCEL BLADELESS 15MM X 100MM STABILITY

## (undated) DEVICE — TROCAR ETHICON ENDOPATH XCEL BLADELESS 5MM X 150MM STABILITY

## (undated) DEVICE — MARKING PEN W RULER

## (undated) DEVICE — SUT VICRYL PLUS 0 54" TIES

## (undated) DEVICE — DRAIN RESERVOIR FOR JACKSON PRATT 100CC CARDINAL

## (undated) DEVICE — APPLICATOR ENDOSCOPIC FOR SUGIFLO

## (undated) DEVICE — DRAPE LEGGINGS XL

## (undated) DEVICE — SUT QUILL POLYPROPYLENE 1 15CM 22MM CLEAR

## (undated) DEVICE — D HELP - CLEARVIEW CLEARIFY SYSTEM

## (undated) DEVICE — TROCAR ETHICON ENDOPATH XCEL BLADELESS 5MM X 100MM STABILITY

## (undated) DEVICE — DRAPE 3/4 SHEET 52X76"

## (undated) DEVICE — TIP METZENBAUM SCISSOR MONOPOLAR ENDOCUT (ORANGE)

## (undated) DEVICE — TROCAR ETHICON ENDOPATH XCEL UNIVERSAL SLEEVE WITH OPTIVIEW 5MM X 100MM